# Patient Record
Sex: MALE | Race: WHITE | NOT HISPANIC OR LATINO | URBAN - METROPOLITAN AREA
[De-identification: names, ages, dates, MRNs, and addresses within clinical notes are randomized per-mention and may not be internally consistent; named-entity substitution may affect disease eponyms.]

---

## 2017-01-04 ENCOUNTER — OFFICE VISIT - HEALTHEAST (OUTPATIENT)
Dept: BEHAVIORAL HEALTH | Facility: CLINIC | Age: 37
End: 2017-01-04

## 2017-01-04 DIAGNOSIS — F20.0 PARANOID SCHIZOPHRENIA, CHRONIC CONDITION WITH ACUTE EXACERBATION (H): ICD-10-CM

## 2017-01-10 ENCOUNTER — COMMUNICATION - HEALTHEAST (OUTPATIENT)
Dept: BEHAVIORAL HEALTH | Facility: CLINIC | Age: 37
End: 2017-01-10

## 2017-01-10 DIAGNOSIS — F20.0 PARANOID SCHIZOPHRENIA, CHRONIC CONDITION (H): ICD-10-CM

## 2017-01-11 ENCOUNTER — OFFICE VISIT - HEALTHEAST (OUTPATIENT)
Dept: BEHAVIORAL HEALTH | Facility: CLINIC | Age: 37
End: 2017-01-11

## 2017-01-11 DIAGNOSIS — F20.0 PARANOID SCHIZOPHRENIA, CHRONIC CONDITION WITH ACUTE EXACERBATION (H): ICD-10-CM

## 2017-01-18 ENCOUNTER — OFFICE VISIT - HEALTHEAST (OUTPATIENT)
Dept: BEHAVIORAL HEALTH | Facility: CLINIC | Age: 37
End: 2017-01-18

## 2017-01-18 DIAGNOSIS — F20.0 PARANOID SCHIZOPHRENIA, CHRONIC CONDITION WITH ACUTE EXACERBATION (H): ICD-10-CM

## 2017-02-01 ENCOUNTER — OFFICE VISIT - HEALTHEAST (OUTPATIENT)
Dept: BEHAVIORAL HEALTH | Facility: CLINIC | Age: 37
End: 2017-02-01

## 2017-02-01 DIAGNOSIS — F20.0 PARANOID SCHIZOPHRENIA, CHRONIC CONDITION WITH ACUTE EXACERBATION (H): ICD-10-CM

## 2017-02-03 ENCOUNTER — AMBULATORY - HEALTHEAST (OUTPATIENT)
Dept: HEALTH INFORMATION MANAGEMENT | Facility: CLINIC | Age: 37
End: 2017-02-03

## 2017-02-08 ENCOUNTER — OFFICE VISIT - HEALTHEAST (OUTPATIENT)
Dept: BEHAVIORAL HEALTH | Facility: CLINIC | Age: 37
End: 2017-02-08

## 2017-02-08 ENCOUNTER — COMMUNICATION - HEALTHEAST (OUTPATIENT)
Dept: BEHAVIORAL HEALTH | Facility: CLINIC | Age: 37
End: 2017-02-08

## 2017-02-08 DIAGNOSIS — F20.0 PARANOID SCHIZOPHRENIA, CHRONIC CONDITION WITH ACUTE EXACERBATION (H): ICD-10-CM

## 2017-02-08 DIAGNOSIS — F20.0 PARANOID SCHIZOPHRENIA, CHRONIC CONDITION (H): ICD-10-CM

## 2017-02-15 ENCOUNTER — OFFICE VISIT - HEALTHEAST (OUTPATIENT)
Dept: BEHAVIORAL HEALTH | Facility: CLINIC | Age: 37
End: 2017-02-15

## 2017-02-15 DIAGNOSIS — F20.0 PARANOID SCHIZOPHRENIA, CHRONIC CONDITION WITH ACUTE EXACERBATION (H): ICD-10-CM

## 2017-02-22 ENCOUNTER — OFFICE VISIT - HEALTHEAST (OUTPATIENT)
Dept: BEHAVIORAL HEALTH | Facility: CLINIC | Age: 37
End: 2017-02-22

## 2017-02-22 DIAGNOSIS — F20.0 PARANOID SCHIZOPHRENIA, CHRONIC CONDITION WITH ACUTE EXACERBATION (H): ICD-10-CM

## 2017-03-01 ENCOUNTER — OFFICE VISIT - HEALTHEAST (OUTPATIENT)
Dept: BEHAVIORAL HEALTH | Facility: CLINIC | Age: 37
End: 2017-03-01

## 2017-03-01 ENCOUNTER — COMMUNICATION - HEALTHEAST (OUTPATIENT)
Dept: BEHAVIORAL HEALTH | Facility: CLINIC | Age: 37
End: 2017-03-01

## 2017-03-01 DIAGNOSIS — F20.0 PARANOID SCHIZOPHRENIA, CHRONIC CONDITION WITH ACUTE EXACERBATION (H): ICD-10-CM

## 2017-03-08 ENCOUNTER — OFFICE VISIT - HEALTHEAST (OUTPATIENT)
Dept: BEHAVIORAL HEALTH | Facility: CLINIC | Age: 37
End: 2017-03-08

## 2017-03-08 DIAGNOSIS — F20.0 PARANOID SCHIZOPHRENIA, CHRONIC CONDITION WITH ACUTE EXACERBATION (H): ICD-10-CM

## 2017-03-09 ENCOUNTER — COMMUNICATION - HEALTHEAST (OUTPATIENT)
Dept: BEHAVIORAL HEALTH | Facility: CLINIC | Age: 37
End: 2017-03-09

## 2017-03-09 DIAGNOSIS — F20.0 PARANOID SCHIZOPHRENIA, CHRONIC CONDITION (H): ICD-10-CM

## 2017-03-15 ENCOUNTER — OFFICE VISIT - HEALTHEAST (OUTPATIENT)
Dept: BEHAVIORAL HEALTH | Facility: CLINIC | Age: 37
End: 2017-03-15

## 2017-03-15 DIAGNOSIS — F20.0 PARANOID SCHIZOPHRENIA, CHRONIC CONDITION WITH ACUTE EXACERBATION (H): ICD-10-CM

## 2017-03-22 ENCOUNTER — OFFICE VISIT - HEALTHEAST (OUTPATIENT)
Dept: BEHAVIORAL HEALTH | Facility: CLINIC | Age: 37
End: 2017-03-22

## 2017-03-22 DIAGNOSIS — F20.0 PARANOID SCHIZOPHRENIA, CHRONIC CONDITION WITH ACUTE EXACERBATION (H): ICD-10-CM

## 2017-03-29 ENCOUNTER — OFFICE VISIT - HEALTHEAST (OUTPATIENT)
Dept: BEHAVIORAL HEALTH | Facility: CLINIC | Age: 37
End: 2017-03-29

## 2017-03-29 DIAGNOSIS — F20.0 PARANOID SCHIZOPHRENIA, CHRONIC CONDITION WITH ACUTE EXACERBATION (H): ICD-10-CM

## 2017-04-05 ENCOUNTER — COMMUNICATION - HEALTHEAST (OUTPATIENT)
Dept: BEHAVIORAL HEALTH | Facility: CLINIC | Age: 37
End: 2017-04-05

## 2017-04-05 ENCOUNTER — OFFICE VISIT - HEALTHEAST (OUTPATIENT)
Dept: BEHAVIORAL HEALTH | Facility: CLINIC | Age: 37
End: 2017-04-05

## 2017-04-05 DIAGNOSIS — F20.0 PARANOID SCHIZOPHRENIA, CHRONIC CONDITION WITH ACUTE EXACERBATION (H): ICD-10-CM

## 2017-04-05 DIAGNOSIS — F20.0 PARANOID SCHIZOPHRENIA, CHRONIC CONDITION (H): ICD-10-CM

## 2017-04-12 ENCOUNTER — OFFICE VISIT - HEALTHEAST (OUTPATIENT)
Dept: BEHAVIORAL HEALTH | Facility: CLINIC | Age: 37
End: 2017-04-12

## 2017-04-12 DIAGNOSIS — F20.0 PARANOID SCHIZOPHRENIA, CHRONIC CONDITION WITH ACUTE EXACERBATION (H): ICD-10-CM

## 2017-04-19 ENCOUNTER — OFFICE VISIT - HEALTHEAST (OUTPATIENT)
Dept: BEHAVIORAL HEALTH | Facility: CLINIC | Age: 37
End: 2017-04-19

## 2017-04-19 DIAGNOSIS — F20.0 PARANOID SCHIZOPHRENIA, CHRONIC CONDITION WITH ACUTE EXACERBATION (H): ICD-10-CM

## 2017-04-26 ENCOUNTER — OFFICE VISIT - HEALTHEAST (OUTPATIENT)
Dept: BEHAVIORAL HEALTH | Facility: CLINIC | Age: 37
End: 2017-04-26

## 2017-04-26 DIAGNOSIS — F20.0 PARANOID SCHIZOPHRENIA, CHRONIC CONDITION WITH ACUTE EXACERBATION (H): ICD-10-CM

## 2017-05-02 ENCOUNTER — COMMUNICATION - HEALTHEAST (OUTPATIENT)
Dept: BEHAVIORAL HEALTH | Facility: CLINIC | Age: 37
End: 2017-05-02

## 2017-05-02 DIAGNOSIS — F20.0 PARANOID SCHIZOPHRENIA, CHRONIC CONDITION (H): ICD-10-CM

## 2017-05-10 ENCOUNTER — OFFICE VISIT - HEALTHEAST (OUTPATIENT)
Dept: BEHAVIORAL HEALTH | Facility: CLINIC | Age: 37
End: 2017-05-10

## 2017-05-10 DIAGNOSIS — F20.0 PARANOID SCHIZOPHRENIA, CHRONIC CONDITION WITH ACUTE EXACERBATION (H): ICD-10-CM

## 2017-05-17 ENCOUNTER — OFFICE VISIT - HEALTHEAST (OUTPATIENT)
Dept: BEHAVIORAL HEALTH | Facility: CLINIC | Age: 37
End: 2017-05-17

## 2017-05-17 DIAGNOSIS — F20.0 PARANOID SCHIZOPHRENIA, CHRONIC CONDITION WITH ACUTE EXACERBATION (H): ICD-10-CM

## 2017-05-24 ENCOUNTER — OFFICE VISIT - HEALTHEAST (OUTPATIENT)
Dept: BEHAVIORAL HEALTH | Facility: CLINIC | Age: 37
End: 2017-05-24

## 2017-05-24 DIAGNOSIS — F20.0 PARANOID SCHIZOPHRENIA, CHRONIC CONDITION WITH ACUTE EXACERBATION (H): ICD-10-CM

## 2017-05-30 ENCOUNTER — OFFICE VISIT - HEALTHEAST (OUTPATIENT)
Dept: BEHAVIORAL HEALTH | Facility: CLINIC | Age: 37
End: 2017-05-30

## 2017-05-30 DIAGNOSIS — G25.9 MOVEMENT DISORDER: ICD-10-CM

## 2017-05-30 DIAGNOSIS — T43.3X5A: ICD-10-CM

## 2017-05-30 DIAGNOSIS — Z79.899 HIGH RISK MEDICATION USE: ICD-10-CM

## 2017-05-30 DIAGNOSIS — F20.0 CHRONIC PARANOID SCHIZOPHRENIA (H): ICD-10-CM

## 2017-05-30 ASSESSMENT — MIFFLIN-ST. JEOR: SCORE: 1969.06

## 2017-05-31 ENCOUNTER — OFFICE VISIT - HEALTHEAST (OUTPATIENT)
Dept: BEHAVIORAL HEALTH | Facility: CLINIC | Age: 37
End: 2017-05-31

## 2017-05-31 DIAGNOSIS — F20.0 PARANOID SCHIZOPHRENIA, CHRONIC CONDITION WITH ACUTE EXACERBATION (H): ICD-10-CM

## 2017-06-12 ENCOUNTER — COMMUNICATION - HEALTHEAST (OUTPATIENT)
Dept: BEHAVIORAL HEALTH | Facility: CLINIC | Age: 37
End: 2017-06-12

## 2017-06-12 DIAGNOSIS — Z79.899 HIGH RISK MEDICATION USE: ICD-10-CM

## 2017-06-14 ENCOUNTER — OFFICE VISIT - HEALTHEAST (OUTPATIENT)
Dept: BEHAVIORAL HEALTH | Facility: CLINIC | Age: 37
End: 2017-06-14

## 2017-06-14 DIAGNOSIS — F20.0 PARANOID SCHIZOPHRENIA, CHRONIC CONDITION WITH ACUTE EXACERBATION (H): ICD-10-CM

## 2017-07-05 ENCOUNTER — OFFICE VISIT - HEALTHEAST (OUTPATIENT)
Dept: BEHAVIORAL HEALTH | Facility: CLINIC | Age: 37
End: 2017-07-05

## 2017-07-05 DIAGNOSIS — F20.0 PARANOID SCHIZOPHRENIA, CHRONIC CONDITION WITH ACUTE EXACERBATION (H): ICD-10-CM

## 2017-07-12 ENCOUNTER — OFFICE VISIT - HEALTHEAST (OUTPATIENT)
Dept: BEHAVIORAL HEALTH | Facility: CLINIC | Age: 37
End: 2017-07-12

## 2017-07-12 DIAGNOSIS — F20.0 PARANOID SCHIZOPHRENIA, CHRONIC CONDITION WITH ACUTE EXACERBATION (H): ICD-10-CM

## 2017-07-19 ENCOUNTER — OFFICE VISIT - HEALTHEAST (OUTPATIENT)
Dept: BEHAVIORAL HEALTH | Facility: CLINIC | Age: 37
End: 2017-07-19

## 2017-07-19 DIAGNOSIS — F20.0 PARANOID SCHIZOPHRENIA, CHRONIC CONDITION WITH ACUTE EXACERBATION (H): ICD-10-CM

## 2017-07-26 ENCOUNTER — OFFICE VISIT - HEALTHEAST (OUTPATIENT)
Dept: BEHAVIORAL HEALTH | Facility: CLINIC | Age: 37
End: 2017-07-26

## 2017-07-26 DIAGNOSIS — F20.0 PARANOID SCHIZOPHRENIA, CHRONIC CONDITION WITH ACUTE EXACERBATION (H): ICD-10-CM

## 2017-08-16 ENCOUNTER — OFFICE VISIT - HEALTHEAST (OUTPATIENT)
Dept: BEHAVIORAL HEALTH | Facility: CLINIC | Age: 37
End: 2017-08-16

## 2017-08-16 DIAGNOSIS — F20.0 PARANOID SCHIZOPHRENIA, CHRONIC CONDITION WITH ACUTE EXACERBATION (H): ICD-10-CM

## 2017-08-23 ENCOUNTER — OFFICE VISIT - HEALTHEAST (OUTPATIENT)
Dept: BEHAVIORAL HEALTH | Facility: CLINIC | Age: 37
End: 2017-08-23

## 2017-08-23 DIAGNOSIS — F20.0 PARANOID SCHIZOPHRENIA, CHRONIC CONDITION WITH ACUTE EXACERBATION (H): ICD-10-CM

## 2017-08-25 ENCOUNTER — PRE VISIT (OUTPATIENT)
Dept: NEUROLOGY | Facility: CLINIC | Age: 37
End: 2017-08-25

## 2017-08-25 NOTE — TELEPHONE ENCOUNTER
1.  Date/reason for appt:  9/05/17   Tardive Dyskinesia    2.  Referring provider:  Dr Johnson, [facility]    3.  Call to patient (Yes / No - short description):  No referred    Received records from [facility], forwarded to clinic.    Records reviewed.  All records received.

## 2017-08-30 ENCOUNTER — OFFICE VISIT - HEALTHEAST (OUTPATIENT)
Dept: BEHAVIORAL HEALTH | Facility: CLINIC | Age: 37
End: 2017-08-30

## 2017-08-30 ENCOUNTER — COMMUNICATION - HEALTHEAST (OUTPATIENT)
Dept: BEHAVIORAL HEALTH | Facility: CLINIC | Age: 37
End: 2017-08-30

## 2017-08-30 ENCOUNTER — AMBULATORY - HEALTHEAST (OUTPATIENT)
Dept: BEHAVIORAL HEALTH | Facility: CLINIC | Age: 37
End: 2017-08-30

## 2017-08-30 DIAGNOSIS — F20.0 PARANOID SCHIZOPHRENIA, CHRONIC CONDITION WITH ACUTE EXACERBATION (H): ICD-10-CM

## 2017-09-05 ENCOUNTER — OFFICE VISIT (OUTPATIENT)
Dept: NEUROLOGY | Facility: CLINIC | Age: 37
End: 2017-09-05

## 2017-09-05 ENCOUNTER — RECORDS - HEALTHEAST (OUTPATIENT)
Dept: ADMINISTRATIVE | Facility: OTHER | Age: 37
End: 2017-09-05

## 2017-09-05 VITALS — HEIGHT: 66 IN | SYSTOLIC BLOOD PRESSURE: 128 MMHG | HEART RATE: 89 BPM | DIASTOLIC BLOOD PRESSURE: 84 MMHG

## 2017-09-05 DIAGNOSIS — R25.9 ABNORMAL INVOLUNTARY MOVEMENT: Primary | ICD-10-CM

## 2017-09-05 DIAGNOSIS — Z92.89 H/O CT SCAN OF HEAD: ICD-10-CM

## 2017-09-05 PROBLEM — R63.5 WEIGHT GAIN: Status: ACTIVE | Noted: 2017-09-05

## 2017-09-05 PROBLEM — E66.9 OBESITY: Status: ACTIVE | Noted: 2017-09-05

## 2017-09-05 PROBLEM — F25.9 SCHIZOAFFECTIVE DISORDER (H): Status: ACTIVE | Noted: 2017-09-05

## 2017-09-05 PROBLEM — J98.4 CALCIFIED GRANULOMA OF LUNG: Status: ACTIVE | Noted: 2017-09-05

## 2017-09-05 PROBLEM — F31.9 BIPOLAR 1 DISORDER (H): Status: ACTIVE | Noted: 2017-09-05

## 2017-09-05 PROBLEM — E53.8 VITAMIN B12 DEFICIENCY (NON ANEMIC): Status: ACTIVE | Noted: 2017-09-05

## 2017-09-05 PROBLEM — L21.0 DANDRUFF: Status: ACTIVE | Noted: 2017-09-05

## 2017-09-05 PROBLEM — F30.9 MANIA (H): Status: ACTIVE | Noted: 2017-09-05

## 2017-09-05 PROBLEM — M79.602 LEFT ARM PAIN: Status: ACTIVE | Noted: 2017-09-05

## 2017-09-05 PROBLEM — M54.9 BACK ACHE: Status: ACTIVE | Noted: 2017-09-05

## 2017-09-05 PROBLEM — G47.00 INSOMNIA: Status: ACTIVE | Noted: 2017-09-05

## 2017-09-05 PROBLEM — F32.A DEPRESSION: Status: ACTIVE | Noted: 2017-09-05

## 2017-09-05 RX ORDER — FLUPHENAZINE HYDROCHLORIDE 5 MG/1
5 TABLET ORAL
COMMUNITY

## 2017-09-05 RX ORDER — CHOLECALCIFEROL (VITAMIN D3) 25 MCG
TABLET,CHEWABLE ORAL
COMMUNITY

## 2017-09-05 ASSESSMENT — PAIN SCALES - GENERAL: PAINLEVEL: NO PAIN (0)

## 2017-09-05 NOTE — NURSING NOTE
Chief Complaint   Patient presents with     Consult     Tardive dyskinesia   Radha Odell, ASHLEY

## 2017-09-05 NOTE — PATIENT INSTRUCTIONS
N- Acetyl Cysteine (NAC) 600 mg daily can be taken to help with restlessness (Akathisia). Increase to twice daily in two weeks. This is a supplement which can be found at the pharmacy. Keep taking fluphenazine and discuss further dose changes with your psychiatrist. Other medications which could be considered include propranolol, benzodiazepines, or an anticholinergic.

## 2017-09-05 NOTE — PROGRESS NOTES
Summary and Recommendations:     IMPRESSION:  Tardive akathisia associated with neuroleptic use. We reviewed and discussed various treatment options, including N-acetylcysteine (NAC), benzodiazepines, and anticholinergics. He did not tolerate propranolol. Patient would likely not tolerate tetrabenazine due to risk of depression. Benzodiazepines would contribute to sedation. After discussing all the options, he preferred to try a supplement and we recommended trial of NAC. He will start at 600 mg daily and increase to 600 mg bid in two weeks.     RECOMMENDATIONS:  N-Acetylcysteine (NAC), titrate as discussed. If ineffective, could consider other options, as outlined above (benzodiazepines, anticholinergics).     Alexus Germain MD  Movement Disorders Fellow    Patient seen and discussed with Dr. Crow. Assessment and plan developed with Dr. Crow.     PATIENT SEEN AND EXAMINED BY ME on September 5, 2017 I AGREE WITH THE FINDINGS DETAILED BY THE NEUROLOGY Fellow and documented in their note on September 5, 2017   PLEASE REFER TO THEIR NOTE FOR ADDITIONAL DETAILS.     DIAGNOSIS  Akathisia. There may be some subtle td features but not seen or described by patient or mother   MANAGEMENT  As above.     LISE Crow MD  _____________________________________________________________________  Cc: 36 year old male   Consult requested by psychiatry, Dr. Johnson.     Outside records reviewed and revealed  - inserted.       History obtained from patient and his mother with assistance of Congolese interpretor.        History of Present Illness:  37 yo man with schizoaffective disorder here for an evaluation of involuntary movements. He and his mother described swaying movements of the trunk and restlessness. The patient feels this is related to his antipsychotic medication. He has a hard time staying still. If standing, he must be walking. If sitting, his leg is bobbing and he sways with his trunk. He is only still when  he is concentrating. There is no tremor. His psychiatrist decreased the fluphenazine from 7 mg to 5 mg bid and the movements may have decreased. The patient does not like taking medications.      He previously had been on a variety of medications:   zyprexa  abilify  risperdal  Haloperidol    Most currently he is taking:  Fluphenazine 5 mg bid  Vitamin B12  Propanolol 10 mg bid - tried for rocking behavior - took once and no longer taking    2016 July  CT Head Without Contrast7/15/2016  [facility]  Result Impression   CONCLUSION:  1.  No acute hemorrhage, mass or CT evidence of acute infarct.   Result Narrative   [facility]  CT HEAD WO CONTRAST  7/15/2016 3:36 PM     INDICATION: Altered mental status.  TECHNIQUE: Head CT without IV contrast. Dose reduction techniques were used.  COMPARISON: None.    FINDINGS: No acute intracranial hemorrhage. No mass effect. Normal ventricles. The gray-white matter differentiation is maintained. The calvarium and skull base are unremarkable.  Visualized portions of the orbits are unremarkable.  Visualized portions   of the paranasal sinuses and mastoid air cells are clear.       Status         Allergies    No Known Allergies  Current Medications  Reconcile with Patient's Chart    Prescription Sig. Disp. Refills Start Date End Date Status   ARIPiprazole (AKA ABILIFY) 15 MG tablet   Take 15 mg by mouth daily.         Active   vitamin b complex-c (AKA SURBEX-T) capsule   Take 1 Cap by mouth daily.         Active   Active Problems    Problem Noted Date   Obesity (HRC) 07/12/2016   Paranoid schizophrenia (HR) 06/17/2016   Hyperlipidemia (HR) 06/17/2016           14 Review of systems  are negative except for   Patient Active Problem List   Diagnosis     Insomnia     Abnormal involuntary movement     Silvana (H)     Schizoaffective disorder (H)     Calcified granuloma of lung (H)     Obesity     Vitamin B12 deficiency (non anemic)     Dandruff     Weight gain      Left arm pain     Bipolar 1 disorder (H)     Depression     Back ache     Backache     Depressive disorder     Hyperlipidemia     Paranoid schizophrenia (H)     H/O CT scan of head     Chronic paranoid schizophrenia (H)     Clinical depression     Mood disorder in conditions classified elsewhere     Encounters for administrative purposes     Other dysfunctions of sleep stages or arousal from sleep      No Known Allergies  History reviewed. No pertinent surgical history.  Past Medical History:   Diagnosis Date     Abnormal involuntary movement 9/5/2017     Back ache 9/5/2017     Bipolar 1 disorder (H) 9/5/2017     Calcified granuloma of lung (H) 9/5/2017     Dandruff 9/5/2017     Depression 9/5/2017     H/O CT scan of head 9/5/2017 2016 July CT Head Without Contrast7/15/2016 [facility] Result Impression CONCLUSION: 1.  No acute hemorrhage, mass or CT evidence of acute infarct. Result Narrative [facility] CT HEAD WO CONTRAST 7/15/2016 3:36 PM   INDICATION: Altered mental status. TECHNIQUE: Head CT without IV contrast. Dose reduction techniques were used. COMPARISON: None.  FINDINGS: No acute intracrania     Insomnia 9/5/2017     Left arm pain 9/5/2017     Silvana (H) 9/5/2017     Obesity 9/5/2017     Schizoaffective disorder (H) 9/5/2017     Vitamin B12 deficiency (non anemic) 9/5/2017     Weight gain 9/5/2017     Social History     Social History     Marital status: Single     Spouse name: N/A     Number of children: N/A     Years of education: N/A     Occupational History     Not on file.     Social History Main Topics     Smoking status: Never Smoker     Smokeless tobacco: Never Used     Alcohol use Not on file     Drug use: Not on file     Sexual activity: Not on file     Other Topics Concern     Not on file     Social History Narrative    single. lives in [place]. Ilene     History reviewed. No pertinent family history.  Current Outpatient Prescriptions   Medication Sig Dispense  "Refill     fluPHENAZine (PROLIXIN) 5 MG tablet Take 5 mg by mouth       Cyanocobalamin (B-12) 2500 MCG TABS        No family history of schizophrenia, abnormal movements or tremor.     Examination  B/P: Data Unavailable, T: Data Unavailable, P: Data Unavailable, R: Data Unavailable 0 lbs 0 oz  Blood pressure 128/84, pulse 89, height 1.676 m (5' 6\")., There is no height or weight on file to calculate BMI.    General examination: well developed, nourished and normal affect  Carotid: No bruits. Chest CTA, Heart regular without gallops or murmurs. Abdomen soft nontender, no masses, bowel sounds intact. Periphery: normal pulses without edema. No skin lesions. MENTAL STATUS:  Alert, oriented x3.  Speech fluent with normal naming, repetition, comprehension.  Good right-left orientation   CRANIAL NERVES:  Pupils are equal, round, reactive to light.  Normal fields. Extraocular movements full.  Facial sensation and movement normal.  Hearing intact. Palate moves symmetrically.  Tongue midline.  Sternocleidomastoid and trapezius strength intact.  Neck strength was normal.  NEUROLOGIC:  Tone: is normal. Motor in upper and lower extremities. 5/5.  Reflexes 3 bilateral biceps, brachioradialis, triceps, patellae but symmetric with no spread. No clonus and no pathologic reflexes (neg Dane or Babinksi). Good finger-nose-finger, fine finger movement, heel-shin maneuver, sensation to light touch, position sense and vibration and temperature was normal. Gait normal. Tandem is steady. Romberg and postural stability normal. Tremor absent.           "

## 2017-09-05 NOTE — LETTER
9/5/2017     RE: Waylon Townsend  [facility] [place] [id number]     Dear Colleague,    Thank you for referring your patient, Waylon Townsend, to the [facility] NEUROLOGY at [facility]. Please see a copy of my visit note below.    Summary and Recommendations:     IMPRESSION:  Tardive akathisia associated with neuroleptic use. We reviewed and discussed various treatment options, including N-acetylcysteine (NAC), benzodiazepines, and anticholinergics. He did not tolerate propranolol. Patient would likely not tolerate tetrabenazine due to risk of depression. Benzodiazepines would contribute to sedation. After discussing all the options, he preferred to try a supplement and we recommended trial of NAC. He will start at 600 mg daily and increase to 600 mg bid in two weeks.     RECOMMENDATIONS:  N-Acetylcysteine (NAC), titrate as discussed. If ineffective, could consider other options, as outlined above (benzodiazepines, anticholinergics).     Alexus Germain MD  Movement Disorders Fellow    Patient seen and discussed with Dr. Crow. Assessment and plan developed with Dr. Crow.     PATIENT SEEN AND EXAMINED BY ME on September 5, 2017 I AGREE WITH THE FINDINGS DETAILED BY THE NEUROLOGY Fellow and documented in their note on September 5, 2017   PLEASE REFER TO THEIR NOTE FOR ADDITIONAL DETAILS.     DIAGNOSIS  Akathisia. There may be some subtle td features but not seen or described by patient or mother   MANAGEMENT  As above.     LISE Crow MD  _____________________________________________________________________  Cc: 36 year old male   Consult requested by psychiatry, Dr. Johnson.     Outside records reviewed and revealed  - inserted.       History obtained from patient and his mother with assistance of British interpretor.        History of Present Illness:  35 yo man with schizoaffective disorder here for an evaluation of involuntary movements. He and his  mother described swaying movements of the trunk and restlessness. The patient feels this is related to his antipsychotic medication. He has a hard time staying still. If standing, he must be walking. If sitting, his leg is bobbing and he sways with his trunk. He is only still when he is concentrating. There is no tremor. His psychiatrist decreased the fluphenazine from 7 mg to 5 mg bid and the movements may have decreased. The patient does not like taking medications.      He previously had been on a variety of medications:   zyprexa  abilify  risperdal  Haloperidol    Most currently he is taking:  Fluphenazine 5 mg bid  Vitamin B12  Propanolol 10 mg bid - tried for rocking behavior - took once and no longer taking    2016 July  CT Head Without Contrast7/15/2016  [facility]  Result Impression   CONCLUSION:  1.  No acute hemorrhage, mass or CT evidence of acute infarct.   Result Narrative   [facility]  CT HEAD WO CONTRAST  7/15/2016 3:36 PM     INDICATION: Altered mental status.  TECHNIQUE: Head CT without IV contrast. Dose reduction techniques were used.  COMPARISON: None.    FINDINGS: No acute intracranial hemorrhage. No mass effect. Normal ventricles. The gray-white matter differentiation is maintained. The calvarium and skull base are unremarkable.  Visualized portions of the orbits are unremarkable.  Visualized portions   of the paranasal sinuses and mastoid air cells are clear.       Status         Allergies    No Known Allergies  Current Medications  Reconcile with Patient's Chart    Prescription Sig. Disp. Refills Start Date End Date Status   ARIPiprazole (AKA ABILIFY) 15 MG tablet   Take 15 mg by mouth daily.         Active   vitamin b complex-c (AKA SURBEX-T) capsule   Take 1 Cap by mouth daily.         Active   Active Problems    Problem Noted Date   Obesity (HRC) 07/12/2016   Paranoid schizophrenia (HR) 06/17/2016   Hyperlipidemia (HR) 06/17/2016           14 Review of systems   are negative except for   Patient Active Problem List   Diagnosis     Insomnia     Abnormal involuntary movement     Silvana (H)     Schizoaffective disorder (H)     Calcified granuloma of lung (H)     Obesity     Vitamin B12 deficiency (non anemic)     Dandruff     Weight gain     Left arm pain     Bipolar 1 disorder (H)     Depression     Back ache     Backache     Depressive disorder     Hyperlipidemia     Paranoid schizophrenia (H)     H/O CT scan of head     Chronic paranoid schizophrenia (H)     Clinical depression     Mood disorder in conditions classified elsewhere     Encounters for administrative purposes     Other dysfunctions of sleep stages or arousal from sleep      No Known Allergies  History reviewed. No pertinent surgical history.  Past Medical History:   Diagnosis Date     Abnormal involuntary movement 9/5/2017     Back ache 9/5/2017     Bipolar 1 disorder (H) 9/5/2017     Calcified granuloma of lung (H) 9/5/2017     Dandruff 9/5/2017     Depression 9/5/2017     H/O CT scan of head 9/5/2017 2016 July CT Head Without Contrast7/15/2016 [facility] Result Impression CONCLUSION: 1.  No acute hemorrhage, mass or CT evidence of acute infarct. Result Narrative [facility] CT HEAD WO CONTRAST 7/15/2016 3:36 PM   INDICATION: Altered mental status. TECHNIQUE: Head CT without IV contrast. Dose reduction techniques were used. COMPARISON: None.  FINDINGS: No acute intracrania     Insomnia 9/5/2017     Left arm pain 9/5/2017     Silvana (H) 9/5/2017     Obesity 9/5/2017     Schizoaffective disorder (H) 9/5/2017     Vitamin B12 deficiency (non anemic) 9/5/2017     Weight gain 9/5/2017     Social History     Social History     Marital status: Single     Spouse name: N/A     Number of children: N/A     Years of education: N/A     Occupational History     Not on file.     Social History Main Topics     Smoking status: Never Smoker     Smokeless tobacco: Never Used     Alcohol use Not on file      "Drug use: Not on file     Sexual activity: Not on file     Other Topics Concern     Not on file     Social History Narrative    single. lives in [place]. Ilene     History reviewed. No pertinent family history.  Current Outpatient Prescriptions   Medication Sig Dispense Refill     fluPHENAZine (PROLIXIN) 5 MG tablet Take 5 mg by mouth       Cyanocobalamin (B-12) 2500 MCG TABS        No family history of schizophrenia, abnormal movements or tremor.     Examination  B/P: Data Unavailable, T: Data Unavailable, P: Data Unavailable, R: Data Unavailable 0 lbs 0 oz  Blood pressure 128/84, pulse 89, height 1.676 m (5' 6\")., There is no height or weight on file to calculate BMI.    General examination: well developed, nourished and normal affect  Carotid: No bruits. Chest CTA, Heart regular without gallops or murmurs. Abdomen soft nontender, no masses, bowel sounds intact. Periphery: normal pulses without edema. No skin lesions. MENTAL STATUS:  Alert, oriented x3.  Speech fluent with normal naming, repetition, comprehension.  Good right-left orientation   CRANIAL NERVES:  Pupils are equal, round, reactive to light.  Normal fields. Extraocular movements full.  Facial sensation and movement normal.  Hearing intact. Palate moves symmetrically.  Tongue midline.  Sternocleidomastoid and trapezius strength intact.  Neck strength was normal.  NEUROLOGIC:  Tone: is normal. Motor in upper and lower extremities. 5/5.  Reflexes 3 bilateral biceps, brachioradialis, triceps, patellae but symmetric with no spread. No clonus and no pathologic reflexes (neg Dane or Babinksi). Good finger-nose-finger, fine finger movement, heel-shin maneuver, sensation to light touch, position sense and vibration and temperature was normal. Gait normal. Tandem is steady. Romberg and postural stability normal. Tremor absent.     Again, thank you for allowing me to participate in the care of your patient.      Sincerely,    Derian Crow MD      "

## 2017-09-05 NOTE — MR AVS SNAPSHOT
After Visit Summary   2017    Waylon Townsend    MRN: [id number]           Patient Information     Date Of Birth          1980        Visit Information        Provider Department      2017 2:55 PM Marty Vasquez; Derian Crow MD [facility] Neurology        Today's Diagnoses     Abnormal involuntary movement    -  1    H/O CT scan of head          Care Instructions    N- Acetyl Cysteine (NAC) 600 mg daily can be taken to help with restlessness (Akathisia). Increase to twice daily in two weeks. This is a supplement which can be found at the pharmacy. Keep taking fluphenazine and discuss further dose changes with your psychiatrist. Other medications which could be considered include propranolol, benzodiazepines, or an anticholinergic.           Follow-ups after your visit        Who to contact     Please call your clinic at [phone] to:    Ask questions about your health    Make or cancel appointments    Discuss your medicines    Learn about your test results    Speak to your doctor   If you have compliments or concerns about an experience at your clinic, or if you wish to file a complaint, please contact [facility] Patient Relations at [phone] or email us at [email or web address]         Additional Information About Your Visit        MyChart Information     Zaarlyt is an electronic gateway that provides easy, online access to your medical records. With Digitick, you can request a clinic appointment, read your test results, renew a prescription or communicate with your care team.     To sign up for Zaarlyt visit the website at [email or web address]   You will be asked to enter the access code listed below, as well as some personal information. Please follow the directions to create your username and password.     Your access code is: [email or web address]  Expires: 2017  6:30 AM     Your access code will  in 90 days. If you need  "help or a new code, please contact your [facility] or call [phone] for assistance.        Care EveryWhere ID     This is your Care EveryWhere ID. This could be used by other organizations to access your [facility] medical records  [id number]        Your Vitals Were     Pulse Height                89 1.676 m (5' 6\")           Blood Pressure from Last 3 Encounters:   09/05/17 128/84    Weight from Last 3 Encounters:   No data found for Wt              Today, you had the following     No orders found for display       Primary Care Provider Office Phone # Fax #    Concepción Hutchison [phone]       [facility] [address] [facility] [address]        Equal Access to Services     AR GARCIA : Hadii ami gongorao Somehdi, waaxda luqadaha, qaybta kaalmada adeegyada, waxpamela taylorin pietro chino . So [facility] [phone].    ATENCIÓN: Si habla español, tiene a ziegler disposición servicios gratuitos de asistencia lingüística. [facility] [phone].    We comply with applicable federal civil rights laws and [place] laws. We do not discriminate on the basis of race, color, national origin, age, disability sex, sexual orientation or gender identity.            Thank you!     Thank you for choosing [facility] NEUROLOGY  for your care. Our goal is always to provide you with excellent care. Hearing back from our patients is one way we can continue to improve our services. Please take a few minutes to complete the written survey that you may receive in the mail after your visit with us. Thank you!             Your Updated Medication List - Protect others around you: Learn how to safely use, store and throw away your medicines at www.disposemymeds.org.          This list is accurate as of: 9/5/17  4:07 PM.  Always use your most recent med list.                   Brand Name Dispense Instructions for use Diagnosis    B-12 2500 MCG Tabs           fluPHENAZine 5 " MG tablet    PROLIXIN     Take 5 mg by mouth

## 2017-09-05 NOTE — Clinical Note
9/5/2017       RE: Waylon Townsend  [address] [place] [address]     Dear Colleague,    Thank you for referring your patient, Waylon Townsend, to the [facility] NEUROLOGY at [facility]. Please see a copy of my visit note below.    No notes on file    Again, thank you for allowing me to participate in the care of your patient.      Sincerely,    Derian Crow MD

## 2017-09-06 ENCOUNTER — OFFICE VISIT - HEALTHEAST (OUTPATIENT)
Dept: BEHAVIORAL HEALTH | Facility: CLINIC | Age: 37
End: 2017-09-06

## 2017-09-06 DIAGNOSIS — F20.0 PARANOID SCHIZOPHRENIA, CHRONIC CONDITION WITH ACUTE EXACERBATION (H): ICD-10-CM

## 2017-09-07 ENCOUNTER — OFFICE VISIT - HEALTHEAST (OUTPATIENT)
Dept: BEHAVIORAL HEALTH | Facility: CLINIC | Age: 37
End: 2017-09-07

## 2017-09-07 DIAGNOSIS — F25.0 SCHIZOAFFECTIVE DISORDER, BIPOLAR TYPE (H): ICD-10-CM

## 2017-09-07 DIAGNOSIS — T88.7XXA DRUG SIDE EFFECTS: ICD-10-CM

## 2017-09-07 DIAGNOSIS — G25.9 MOVEMENT DISORDER: ICD-10-CM

## 2017-09-07 DIAGNOSIS — Z79.899 HIGH RISK MEDICATION USE: ICD-10-CM

## 2017-09-07 ASSESSMENT — MIFFLIN-ST. JEOR: SCORE: 1987.21

## 2017-09-13 ENCOUNTER — OFFICE VISIT - HEALTHEAST (OUTPATIENT)
Dept: BEHAVIORAL HEALTH | Facility: CLINIC | Age: 37
End: 2017-09-13

## 2017-09-13 DIAGNOSIS — F20.0 PARANOID SCHIZOPHRENIA, CHRONIC CONDITION WITH ACUTE EXACERBATION (H): ICD-10-CM

## 2017-09-20 ENCOUNTER — OFFICE VISIT - HEALTHEAST (OUTPATIENT)
Dept: BEHAVIORAL HEALTH | Facility: CLINIC | Age: 37
End: 2017-09-20

## 2017-09-20 DIAGNOSIS — F20.0 PARANOID SCHIZOPHRENIA, CHRONIC CONDITION WITH ACUTE EXACERBATION (H): ICD-10-CM

## 2017-09-25 ENCOUNTER — COMMUNICATION - HEALTHEAST (OUTPATIENT)
Dept: BEHAVIORAL HEALTH | Facility: CLINIC | Age: 37
End: 2017-09-25

## 2017-10-04 ENCOUNTER — OFFICE VISIT - HEALTHEAST (OUTPATIENT)
Dept: BEHAVIORAL HEALTH | Facility: CLINIC | Age: 37
End: 2017-10-04

## 2017-10-04 DIAGNOSIS — F20.0 PARANOID SCHIZOPHRENIA, CHRONIC CONDITION WITH ACUTE EXACERBATION (H): ICD-10-CM

## 2017-10-11 ENCOUNTER — OFFICE VISIT - HEALTHEAST (OUTPATIENT)
Dept: BEHAVIORAL HEALTH | Facility: CLINIC | Age: 37
End: 2017-10-11

## 2017-10-11 DIAGNOSIS — F20.0 PARANOID SCHIZOPHRENIA, CHRONIC CONDITION WITH ACUTE EXACERBATION (H): ICD-10-CM

## 2017-10-18 ENCOUNTER — OFFICE VISIT - HEALTHEAST (OUTPATIENT)
Dept: BEHAVIORAL HEALTH | Facility: CLINIC | Age: 37
End: 2017-10-18

## 2017-10-18 DIAGNOSIS — F20.0 PARANOID SCHIZOPHRENIA, CHRONIC CONDITION WITH ACUTE EXACERBATION (H): ICD-10-CM

## 2017-10-25 ENCOUNTER — OFFICE VISIT - HEALTHEAST (OUTPATIENT)
Dept: BEHAVIORAL HEALTH | Facility: CLINIC | Age: 37
End: 2017-10-25

## 2017-10-25 DIAGNOSIS — F20.0 PARANOID SCHIZOPHRENIA, CHRONIC CONDITION WITH ACUTE EXACERBATION (H): ICD-10-CM

## 2017-10-26 ENCOUNTER — COMMUNICATION - HEALTHEAST (OUTPATIENT)
Dept: BEHAVIORAL HEALTH | Facility: CLINIC | Age: 37
End: 2017-10-26

## 2017-10-26 DIAGNOSIS — F25.0 SCHIZOAFFECTIVE DISORDER, BIPOLAR TYPE (H): ICD-10-CM

## 2017-11-01 ENCOUNTER — OFFICE VISIT - HEALTHEAST (OUTPATIENT)
Dept: BEHAVIORAL HEALTH | Facility: CLINIC | Age: 37
End: 2017-11-01

## 2017-11-01 DIAGNOSIS — F20.0 PARANOID SCHIZOPHRENIA, CHRONIC CONDITION WITH ACUTE EXACERBATION (H): ICD-10-CM

## 2017-11-22 ENCOUNTER — OFFICE VISIT - HEALTHEAST (OUTPATIENT)
Dept: BEHAVIORAL HEALTH | Facility: CLINIC | Age: 37
End: 2017-11-22

## 2017-11-22 DIAGNOSIS — F20.0 PARANOID SCHIZOPHRENIA, CHRONIC CONDITION WITH ACUTE EXACERBATION (H): ICD-10-CM

## 2017-11-24 ENCOUNTER — AMBULATORY - HEALTHEAST (OUTPATIENT)
Dept: BEHAVIORAL HEALTH | Facility: CLINIC | Age: 37
End: 2017-11-24

## 2017-12-15 ENCOUNTER — COMMUNICATION - HEALTHEAST (OUTPATIENT)
Dept: BEHAVIORAL HEALTH | Facility: CLINIC | Age: 37
End: 2017-12-15

## 2017-12-20 ENCOUNTER — OFFICE VISIT - HEALTHEAST (OUTPATIENT)
Dept: BEHAVIORAL HEALTH | Facility: CLINIC | Age: 37
End: 2017-12-20

## 2017-12-20 DIAGNOSIS — F20.0 PARANOID SCHIZOPHRENIA, CHRONIC CONDITION WITH ACUTE EXACERBATION (H): ICD-10-CM

## 2017-12-27 ENCOUNTER — OFFICE VISIT - HEALTHEAST (OUTPATIENT)
Dept: BEHAVIORAL HEALTH | Facility: CLINIC | Age: 37
End: 2017-12-27

## 2017-12-27 DIAGNOSIS — F20.0 PARANOID SCHIZOPHRENIA, CHRONIC CONDITION WITH ACUTE EXACERBATION (H): ICD-10-CM

## 2018-01-03 ENCOUNTER — OFFICE VISIT - HEALTHEAST (OUTPATIENT)
Dept: BEHAVIORAL HEALTH | Facility: CLINIC | Age: 38
End: 2018-01-03

## 2018-01-03 DIAGNOSIS — F20.0 PARANOID SCHIZOPHRENIA, CHRONIC CONDITION WITH ACUTE EXACERBATION (H): ICD-10-CM

## 2018-01-09 ENCOUNTER — OFFICE VISIT - HEALTHEAST (OUTPATIENT)
Dept: BEHAVIORAL HEALTH | Facility: CLINIC | Age: 38
End: 2018-01-09

## 2018-01-09 ENCOUNTER — COMMUNICATION - HEALTHEAST (OUTPATIENT)
Dept: BEHAVIORAL HEALTH | Facility: CLINIC | Age: 38
End: 2018-01-09

## 2018-01-09 DIAGNOSIS — Z79.899 HIGH RISK MEDICATION USE: ICD-10-CM

## 2018-01-09 DIAGNOSIS — G25.9 MOVEMENT DISORDER: ICD-10-CM

## 2018-01-09 DIAGNOSIS — T50.905D ADVERSE EFFECT OF DRUG, SUBSEQUENT ENCOUNTER: ICD-10-CM

## 2018-01-09 DIAGNOSIS — T88.7XXA DRUG SIDE EFFECTS: ICD-10-CM

## 2018-01-09 DIAGNOSIS — T43.3X5A: ICD-10-CM

## 2018-01-09 DIAGNOSIS — F25.0 SCHIZOAFFECTIVE DISORDER, BIPOLAR TYPE (H): ICD-10-CM

## 2018-01-09 DIAGNOSIS — F20.5 RESIDUAL SCHIZOPHRENIA (H): ICD-10-CM

## 2018-01-09 DIAGNOSIS — F20.0 PARANOID SCHIZOPHRENIA (H): ICD-10-CM

## 2018-01-09 DIAGNOSIS — F20.0 PARANOID SCHIZOPHRENIA, CHRONIC CONDITION (H): ICD-10-CM

## 2018-01-09 ASSESSMENT — MIFFLIN-ST. JEOR: SCORE: 2005.35

## 2018-01-17 ENCOUNTER — OFFICE VISIT - HEALTHEAST (OUTPATIENT)
Dept: BEHAVIORAL HEALTH | Facility: CLINIC | Age: 38
End: 2018-01-17

## 2018-01-17 DIAGNOSIS — F25.0 SCHIZOAFFECTIVE DISORDER, BIPOLAR TYPE (H): ICD-10-CM

## 2018-01-31 ENCOUNTER — OFFICE VISIT - HEALTHEAST (OUTPATIENT)
Dept: BEHAVIORAL HEALTH | Facility: CLINIC | Age: 38
End: 2018-01-31

## 2018-01-31 DIAGNOSIS — F25.0 SCHIZOAFFECTIVE DISORDER, BIPOLAR TYPE (H): ICD-10-CM

## 2018-02-07 ENCOUNTER — OFFICE VISIT - HEALTHEAST (OUTPATIENT)
Dept: BEHAVIORAL HEALTH | Facility: CLINIC | Age: 38
End: 2018-02-07

## 2018-02-07 DIAGNOSIS — F25.0 SCHIZOAFFECTIVE DISORDER, BIPOLAR TYPE (H): ICD-10-CM

## 2018-02-09 ENCOUNTER — TELEPHONE (OUTPATIENT)
Dept: CARE COORDINATION | Facility: CLINIC | Age: 38
End: 2018-02-09

## 2018-02-09 NOTE — TELEPHONE ENCOUNTER
"I received Authorization for release of Information from [facility] requesting last office visit, current medication list and diagnosis list per request, this information was faxed over at 1pm on 2/9/18 by myself and showed as \"ok\"   "

## 2018-02-14 ENCOUNTER — OFFICE VISIT - HEALTHEAST (OUTPATIENT)
Dept: BEHAVIORAL HEALTH | Facility: CLINIC | Age: 38
End: 2018-02-14

## 2018-02-14 DIAGNOSIS — F25.0 SCHIZOAFFECTIVE DISORDER, BIPOLAR TYPE (H): ICD-10-CM

## 2018-02-19 ENCOUNTER — TELEPHONE (OUTPATIENT)
Dept: NEUROLOGY | Facility: CLINIC | Age: 38
End: 2018-02-19

## 2018-02-23 ENCOUNTER — TELEPHONE (OUTPATIENT)
Dept: NEUROLOGY | Facility: CLINIC | Age: 38
End: 2018-02-23

## 2018-02-23 NOTE — TELEPHONE ENCOUNTER
----- Message from Tosha Matta sent at 2/23/2018  8:59 AM CST -----  Regarding: Pt saw Dr Crow once - day care program needs to know if he prescribed any medications  Contact: [phone]  Pt of John Paul Tse (you can call her Maggie) called from [facility], Ph # [phone], Fax # [phone],    Said she needs a call back regarding Pt medications, Pt was seen once by Dr Crow and they need to know if he prescribed any medications for this Pt or not,    Please let them know either way, Said Pt is on Prolixin and Acetylcysteine. They need to know if those are from Dr Crow or not. And if they are they need clear instructions on dosage etc.    Please return her call,    Thank you,  Tosha  [facility]    Please DO NOT send this message and/or reply back to sender.  Call Center Representatives DO NOT respond to messages.

## 2018-02-28 ENCOUNTER — OFFICE VISIT - HEALTHEAST (OUTPATIENT)
Dept: BEHAVIORAL HEALTH | Facility: CLINIC | Age: 38
End: 2018-02-28

## 2018-02-28 DIAGNOSIS — F25.0 SCHIZOAFFECTIVE DISORDER, BIPOLAR TYPE (H): ICD-10-CM

## 2018-03-07 ENCOUNTER — OFFICE VISIT - HEALTHEAST (OUTPATIENT)
Dept: BEHAVIORAL HEALTH | Facility: CLINIC | Age: 38
End: 2018-03-07

## 2018-03-07 DIAGNOSIS — F25.0 SCHIZOAFFECTIVE DISORDER, BIPOLAR TYPE (H): ICD-10-CM

## 2018-03-14 ENCOUNTER — OFFICE VISIT - HEALTHEAST (OUTPATIENT)
Dept: BEHAVIORAL HEALTH | Facility: CLINIC | Age: 38
End: 2018-03-14

## 2018-03-14 DIAGNOSIS — F25.0 SCHIZOAFFECTIVE DISORDER, BIPOLAR TYPE (H): ICD-10-CM

## 2018-03-21 ENCOUNTER — OFFICE VISIT - HEALTHEAST (OUTPATIENT)
Dept: BEHAVIORAL HEALTH | Facility: CLINIC | Age: 38
End: 2018-03-21

## 2018-03-21 DIAGNOSIS — F25.0 SCHIZOAFFECTIVE DISORDER, BIPOLAR TYPE (H): ICD-10-CM

## 2018-03-28 ENCOUNTER — OFFICE VISIT - HEALTHEAST (OUTPATIENT)
Dept: BEHAVIORAL HEALTH | Facility: CLINIC | Age: 38
End: 2018-03-28

## 2018-03-28 DIAGNOSIS — F25.0 SCHIZOAFFECTIVE DISORDER, BIPOLAR TYPE (H): ICD-10-CM

## 2018-04-04 ENCOUNTER — OFFICE VISIT - HEALTHEAST (OUTPATIENT)
Dept: BEHAVIORAL HEALTH | Facility: CLINIC | Age: 38
End: 2018-04-04

## 2018-04-04 DIAGNOSIS — F25.0 SCHIZOAFFECTIVE DISORDER, BIPOLAR TYPE (H): ICD-10-CM

## 2018-04-10 ENCOUNTER — OFFICE VISIT - HEALTHEAST (OUTPATIENT)
Dept: BEHAVIORAL HEALTH | Facility: CLINIC | Age: 38
End: 2018-04-10

## 2018-04-10 DIAGNOSIS — G25.9 MOVEMENT DISORDER: ICD-10-CM

## 2018-04-10 DIAGNOSIS — F25.0 SCHIZOAFFECTIVE DISORDER, BIPOLAR TYPE (H): ICD-10-CM

## 2018-04-10 DIAGNOSIS — Z79.899 HIGH RISK MEDICATION USE: ICD-10-CM

## 2018-04-10 DIAGNOSIS — Z71.89 ENCOUNTER FOR FAMILY CONFERENCE WITH PATIENT PRESENT: ICD-10-CM

## 2018-04-10 ASSESSMENT — MIFFLIN-ST. JEOR: SCORE: 1996.28

## 2018-04-11 ENCOUNTER — OFFICE VISIT - HEALTHEAST (OUTPATIENT)
Dept: BEHAVIORAL HEALTH | Facility: CLINIC | Age: 38
End: 2018-04-11

## 2018-04-11 DIAGNOSIS — F25.0 SCHIZOAFFECTIVE DISORDER, BIPOLAR TYPE (H): ICD-10-CM

## 2018-04-18 ENCOUNTER — OFFICE VISIT - HEALTHEAST (OUTPATIENT)
Dept: BEHAVIORAL HEALTH | Facility: CLINIC | Age: 38
End: 2018-04-18

## 2018-04-18 DIAGNOSIS — F25.0 SCHIZOAFFECTIVE DISORDER, BIPOLAR TYPE (H): ICD-10-CM

## 2018-04-25 ENCOUNTER — OFFICE VISIT - HEALTHEAST (OUTPATIENT)
Dept: BEHAVIORAL HEALTH | Facility: CLINIC | Age: 38
End: 2018-04-25

## 2018-04-25 DIAGNOSIS — F25.0 SCHIZOAFFECTIVE DISORDER, BIPOLAR TYPE (H): ICD-10-CM

## 2018-05-02 ENCOUNTER — OFFICE VISIT - HEALTHEAST (OUTPATIENT)
Dept: BEHAVIORAL HEALTH | Facility: CLINIC | Age: 38
End: 2018-05-02

## 2018-05-02 DIAGNOSIS — F25.0 SCHIZOAFFECTIVE DISORDER, BIPOLAR TYPE (H): ICD-10-CM

## 2018-05-09 ENCOUNTER — OFFICE VISIT - HEALTHEAST (OUTPATIENT)
Dept: BEHAVIORAL HEALTH | Facility: CLINIC | Age: 38
End: 2018-05-09

## 2018-05-09 DIAGNOSIS — F25.0 SCHIZOAFFECTIVE DISORDER, BIPOLAR TYPE (H): ICD-10-CM

## 2018-05-16 ENCOUNTER — OFFICE VISIT - HEALTHEAST (OUTPATIENT)
Dept: BEHAVIORAL HEALTH | Facility: CLINIC | Age: 38
End: 2018-05-16

## 2018-05-16 DIAGNOSIS — F25.0 SCHIZOAFFECTIVE DISORDER, BIPOLAR TYPE (H): ICD-10-CM

## 2018-05-23 ENCOUNTER — OFFICE VISIT - HEALTHEAST (OUTPATIENT)
Dept: BEHAVIORAL HEALTH | Facility: CLINIC | Age: 38
End: 2018-05-23

## 2018-05-23 DIAGNOSIS — F25.0 SCHIZOAFFECTIVE DISORDER, BIPOLAR TYPE (H): ICD-10-CM

## 2018-05-30 ENCOUNTER — OFFICE VISIT - HEALTHEAST (OUTPATIENT)
Dept: BEHAVIORAL HEALTH | Facility: CLINIC | Age: 38
End: 2018-05-30

## 2018-05-30 DIAGNOSIS — F25.0 SCHIZOAFFECTIVE DISORDER, BIPOLAR TYPE (H): ICD-10-CM

## 2018-06-06 ENCOUNTER — OFFICE VISIT - HEALTHEAST (OUTPATIENT)
Dept: BEHAVIORAL HEALTH | Facility: CLINIC | Age: 38
End: 2018-06-06

## 2018-06-06 DIAGNOSIS — F25.0 SCHIZOAFFECTIVE DISORDER, BIPOLAR TYPE (H): ICD-10-CM

## 2018-06-19 ENCOUNTER — OFFICE VISIT - HEALTHEAST (OUTPATIENT)
Dept: BEHAVIORAL HEALTH | Facility: CLINIC | Age: 38
End: 2018-06-19

## 2018-06-19 DIAGNOSIS — G25.9 MOVEMENT DISORDER: ICD-10-CM

## 2018-06-19 DIAGNOSIS — E66.01 MORBID OBESITY (H): ICD-10-CM

## 2018-06-19 DIAGNOSIS — Z79.899 HIGH RISK MEDICATION USE: ICD-10-CM

## 2018-06-19 DIAGNOSIS — F25.0 SCHIZOAFFECTIVE DISORDER, BIPOLAR TYPE (H): ICD-10-CM

## 2018-06-19 ASSESSMENT — MIFFLIN-ST. JEOR: SCORE: 2032.57

## 2018-06-27 ENCOUNTER — OFFICE VISIT - HEALTHEAST (OUTPATIENT)
Dept: BEHAVIORAL HEALTH | Facility: CLINIC | Age: 38
End: 2018-06-27

## 2018-06-27 DIAGNOSIS — F25.0 SCHIZOAFFECTIVE DISORDER, BIPOLAR TYPE (H): ICD-10-CM

## 2018-07-18 ENCOUNTER — OFFICE VISIT - HEALTHEAST (OUTPATIENT)
Dept: BEHAVIORAL HEALTH | Facility: CLINIC | Age: 38
End: 2018-07-18

## 2018-07-18 DIAGNOSIS — F25.0 SCHIZOAFFECTIVE DISORDER, BIPOLAR TYPE (H): ICD-10-CM

## 2018-07-25 ENCOUNTER — OFFICE VISIT - HEALTHEAST (OUTPATIENT)
Dept: BEHAVIORAL HEALTH | Facility: CLINIC | Age: 38
End: 2018-07-25

## 2018-07-25 DIAGNOSIS — F25.0 SCHIZOAFFECTIVE DISORDER, BIPOLAR TYPE (H): ICD-10-CM

## 2018-09-18 ENCOUNTER — OFFICE VISIT - HEALTHEAST (OUTPATIENT)
Dept: BEHAVIORAL HEALTH | Facility: CLINIC | Age: 38
End: 2018-09-18

## 2018-09-18 DIAGNOSIS — Z79.899 HIGH RISK MEDICATION USE: ICD-10-CM

## 2018-09-18 DIAGNOSIS — F25.0 SCHIZOAFFECTIVE DISORDER, BIPOLAR TYPE (H): ICD-10-CM

## 2018-09-18 DIAGNOSIS — E66.01 MORBID OBESITY (H): ICD-10-CM

## 2018-09-18 ASSESSMENT — MIFFLIN-ST. JEOR: SCORE: 2018.96

## 2018-11-05 ENCOUNTER — COMMUNICATION - HEALTHEAST (OUTPATIENT)
Dept: BEHAVIORAL HEALTH | Facility: CLINIC | Age: 38
End: 2018-11-05

## 2018-12-19 ENCOUNTER — COMMUNICATION - HEALTHEAST (OUTPATIENT)
Dept: BEHAVIORAL HEALTH | Facility: CLINIC | Age: 38
End: 2018-12-19

## 2019-01-08 ENCOUNTER — OFFICE VISIT - HEALTHEAST (OUTPATIENT)
Dept: BEHAVIORAL HEALTH | Facility: CLINIC | Age: 39
End: 2019-01-08

## 2019-01-08 DIAGNOSIS — Z79.899 HIGH RISK MEDICATION USE: ICD-10-CM

## 2019-01-08 DIAGNOSIS — F25.0 SCHIZOAFFECTIVE DISORDER, BIPOLAR TYPE (H): ICD-10-CM

## 2019-01-08 ASSESSMENT — MIFFLIN-ST. JEOR: SCORE: 1991.74

## 2019-01-15 ENCOUNTER — COMMUNICATION - HEALTHEAST (OUTPATIENT)
Dept: BEHAVIORAL HEALTH | Facility: CLINIC | Age: 39
End: 2019-01-15

## 2019-02-11 ENCOUNTER — COMMUNICATION - HEALTHEAST (OUTPATIENT)
Dept: BEHAVIORAL HEALTH | Facility: CLINIC | Age: 39
End: 2019-02-11

## 2019-02-18 ENCOUNTER — RECORDS - HEALTHEAST (OUTPATIENT)
Dept: ADMINISTRATIVE | Facility: OTHER | Age: 39
End: 2019-02-18

## 2019-02-18 ENCOUNTER — COMMUNICATION - HEALTHEAST (OUTPATIENT)
Dept: BEHAVIORAL HEALTH | Facility: CLINIC | Age: 39
End: 2019-02-18

## 2019-02-20 ENCOUNTER — RECORDS - HEALTHEAST (OUTPATIENT)
Dept: ADMINISTRATIVE | Facility: OTHER | Age: 39
End: 2019-02-20

## 2019-03-11 ENCOUNTER — COMMUNICATION - HEALTHEAST (OUTPATIENT)
Dept: BEHAVIORAL HEALTH | Facility: CLINIC | Age: 39
End: 2019-03-11

## 2019-04-17 ENCOUNTER — COMMUNICATION - HEALTHEAST (OUTPATIENT)
Dept: BEHAVIORAL HEALTH | Facility: CLINIC | Age: 39
End: 2019-04-17

## 2019-05-03 ENCOUNTER — OFFICE VISIT - HEALTHEAST (OUTPATIENT)
Dept: BEHAVIORAL HEALTH | Facility: CLINIC | Age: 39
End: 2019-05-03

## 2019-05-03 ENCOUNTER — AMBULATORY - HEALTHEAST (OUTPATIENT)
Dept: BEHAVIORAL HEALTH | Facility: CLINIC | Age: 39
End: 2019-05-03

## 2019-05-03 ENCOUNTER — COMMUNICATION - HEALTHEAST (OUTPATIENT)
Dept: BEHAVIORAL HEALTH | Facility: CLINIC | Age: 39
End: 2019-05-03

## 2019-05-03 DIAGNOSIS — F25.0 SCHIZOAFFECTIVE DISORDER, BIPOLAR TYPE (H): ICD-10-CM

## 2019-05-03 DIAGNOSIS — Z79.899 HIGH RISK MEDICATION USE: ICD-10-CM

## 2019-05-03 ASSESSMENT — MIFFLIN-ST. JEOR: SCORE: 1978.13

## 2019-05-30 ENCOUNTER — COMMUNICATION - HEALTHEAST (OUTPATIENT)
Dept: BEHAVIORAL HEALTH | Facility: CLINIC | Age: 39
End: 2019-05-30

## 2019-07-02 ENCOUNTER — COMMUNICATION - HEALTHEAST (OUTPATIENT)
Dept: BEHAVIORAL HEALTH | Facility: CLINIC | Age: 39
End: 2019-07-02

## 2019-07-02 ENCOUNTER — OFFICE VISIT - HEALTHEAST (OUTPATIENT)
Dept: BEHAVIORAL HEALTH | Facility: CLINIC | Age: 39
End: 2019-07-02

## 2019-07-02 DIAGNOSIS — F25.9 SCHIZOAFFECTIVE DISORDER, CHRONIC CONDITION WITH ACUTE EXACERBATION (H): ICD-10-CM

## 2019-07-02 DIAGNOSIS — F66 SEXUAL DISINHIBITION: ICD-10-CM

## 2019-07-02 DIAGNOSIS — Z79.899 HIGH RISK MEDICATION USE: ICD-10-CM

## 2019-07-02 ASSESSMENT — MIFFLIN-ST. JEOR: SCORE: 2032.57

## 2019-07-09 ENCOUNTER — COMMUNICATION - HEALTHEAST (OUTPATIENT)
Dept: BEHAVIORAL HEALTH | Facility: CLINIC | Age: 39
End: 2019-07-09

## 2019-07-23 ENCOUNTER — COMMUNICATION - HEALTHEAST (OUTPATIENT)
Dept: BEHAVIORAL HEALTH | Facility: CLINIC | Age: 39
End: 2019-07-23

## 2019-08-01 ENCOUNTER — OFFICE VISIT - HEALTHEAST (OUTPATIENT)
Dept: BEHAVIORAL HEALTH | Facility: CLINIC | Age: 39
End: 2019-08-01

## 2019-08-01 DIAGNOSIS — F17.200 NICOTINE DEPENDENCE, UNCOMPLICATED, UNSPECIFIED NICOTINE PRODUCT TYPE: ICD-10-CM

## 2019-08-01 DIAGNOSIS — Z78.9 ALCOHOL USE: ICD-10-CM

## 2019-08-01 DIAGNOSIS — F25.0 SCHIZOAFFECTIVE DISORDER, BIPOLAR TYPE (H): ICD-10-CM

## 2019-08-01 DIAGNOSIS — Z71.89 ENCOUNTER FOR FAMILY CONFERENCE WITH PATIENT PRESENT: ICD-10-CM

## 2019-08-01 DIAGNOSIS — Z79.899 HIGH RISK MEDICATION USE: ICD-10-CM

## 2019-08-01 DIAGNOSIS — T88.7XXA DRUG SIDE EFFECTS: ICD-10-CM

## 2019-08-01 ASSESSMENT — MIFFLIN-ST. JEOR: SCORE: 2037.75

## 2019-11-12 ENCOUNTER — COMMUNICATION - HEALTHEAST (OUTPATIENT)
Dept: BEHAVIORAL HEALTH | Facility: CLINIC | Age: 39
End: 2019-11-12

## 2019-11-14 ENCOUNTER — OFFICE VISIT - HEALTHEAST (OUTPATIENT)
Dept: BEHAVIORAL HEALTH | Facility: CLINIC | Age: 39
End: 2019-11-14

## 2019-11-14 DIAGNOSIS — F17.200 NICOTINE DEPENDENCE, UNCOMPLICATED, UNSPECIFIED NICOTINE PRODUCT TYPE: ICD-10-CM

## 2019-11-14 DIAGNOSIS — Z79.899 HIGH RISK MEDICATION USE: ICD-10-CM

## 2019-11-14 DIAGNOSIS — E66.01 MORBID OBESITY (H): ICD-10-CM

## 2019-11-14 DIAGNOSIS — F25.0 SCHIZOAFFECTIVE DISORDER, BIPOLAR TYPE (H): ICD-10-CM

## 2019-11-14 DIAGNOSIS — T88.7XXA DRUG SIDE EFFECTS: ICD-10-CM

## 2019-11-14 DIAGNOSIS — Z71.89 ENCOUNTER FOR FAMILY CONFERENCE WITH PATIENT PRESENT: ICD-10-CM

## 2019-11-14 ASSESSMENT — ANXIETY QUESTIONNAIRES
1. FEELING NERVOUS, ANXIOUS, OR ON EDGE: NOT AT ALL
3. WORRYING TOO MUCH ABOUT DIFFERENT THINGS: NOT AT ALL
6. BECOMING EASILY ANNOYED OR IRRITABLE: NOT AT ALL
2. NOT BEING ABLE TO STOP OR CONTROL WORRYING: NOT AT ALL
5. BEING SO RESTLESS THAT IT IS HARD TO SIT STILL: NOT AT ALL
7. FEELING AFRAID AS IF SOMETHING AWFUL MIGHT HAPPEN: NOT AT ALL
4. TROUBLE RELAXING: NOT AT ALL
GAD7 TOTAL SCORE: 0

## 2019-11-14 ASSESSMENT — PATIENT HEALTH QUESTIONNAIRE - PHQ9: SUM OF ALL RESPONSES TO PHQ QUESTIONS 1-9: 0

## 2019-11-14 ASSESSMENT — MIFFLIN-ST. JEOR: SCORE: 2118.75

## 2019-12-04 ENCOUNTER — COMMUNICATION - HEALTHEAST (OUTPATIENT)
Dept: BEHAVIORAL HEALTH | Facility: CLINIC | Age: 39
End: 2019-12-04

## 2020-01-10 ENCOUNTER — COMMUNICATION - HEALTHEAST (OUTPATIENT)
Dept: BEHAVIORAL HEALTH | Facility: CLINIC | Age: 40
End: 2020-01-10

## 2020-01-10 DIAGNOSIS — F25.0 SCHIZOAFFECTIVE DISORDER, BIPOLAR TYPE (H): ICD-10-CM

## 2020-04-09 ENCOUNTER — OFFICE VISIT - HEALTHEAST (OUTPATIENT)
Dept: BEHAVIORAL HEALTH | Facility: CLINIC | Age: 40
End: 2020-04-09

## 2020-04-09 DIAGNOSIS — F25.0 SCHIZOAFFECTIVE DISORDER, BIPOLAR TYPE (H): ICD-10-CM

## 2020-04-09 DIAGNOSIS — F17.200 NICOTINE DEPENDENCE, UNCOMPLICATED, UNSPECIFIED NICOTINE PRODUCT TYPE: ICD-10-CM

## 2020-04-09 DIAGNOSIS — Z79.899 HIGH RISK MEDICATION USE: ICD-10-CM

## 2020-04-09 DIAGNOSIS — E66.01 MORBID OBESITY (H): ICD-10-CM

## 2020-04-09 DIAGNOSIS — Z71.89 ENCOUNTER FOR FAMILY CONFERENCE WITH PATIENT PRESENT: ICD-10-CM

## 2020-04-09 ASSESSMENT — MIFFLIN-ST. JEOR: SCORE: 2009.89

## 2020-04-10 ASSESSMENT — ANXIETY QUESTIONNAIRES
1. FEELING NERVOUS, ANXIOUS, OR ON EDGE: NOT AT ALL
6. BECOMING EASILY ANNOYED OR IRRITABLE: NOT AT ALL
IF YOU CHECKED OFF ANY PROBLEMS ON THIS QUESTIONNAIRE, HOW DIFFICULT HAVE THESE PROBLEMS MADE IT FOR YOU TO DO YOUR WORK, TAKE CARE OF THINGS AT HOME, OR GET ALONG WITH OTHER PEOPLE: NOT DIFFICULT AT ALL
3. WORRYING TOO MUCH ABOUT DIFFERENT THINGS: NOT AT ALL
2. NOT BEING ABLE TO STOP OR CONTROL WORRYING: NOT AT ALL
7. FEELING AFRAID AS IF SOMETHING AWFUL MIGHT HAPPEN: NOT AT ALL
5. BEING SO RESTLESS THAT IT IS HARD TO SIT STILL: NOT AT ALL
GAD7 TOTAL SCORE: 0
4. TROUBLE RELAXING: NOT AT ALL

## 2020-04-10 ASSESSMENT — PATIENT HEALTH QUESTIONNAIRE - PHQ9: SUM OF ALL RESPONSES TO PHQ QUESTIONS 1-9: 0

## 2020-07-14 ENCOUNTER — COMMUNICATION - HEALTHEAST (OUTPATIENT)
Dept: BEHAVIORAL HEALTH | Facility: CLINIC | Age: 40
End: 2020-07-14

## 2020-07-14 DIAGNOSIS — F25.0 SCHIZOAFFECTIVE DISORDER, BIPOLAR TYPE (H): ICD-10-CM

## 2020-11-17 ENCOUNTER — COMMUNICATION - HEALTHEAST (OUTPATIENT)
Dept: BEHAVIORAL HEALTH | Facility: CLINIC | Age: 40
End: 2020-11-17

## 2021-01-12 ENCOUNTER — COMMUNICATION - HEALTHEAST (OUTPATIENT)
Dept: BEHAVIORAL HEALTH | Facility: CLINIC | Age: 41
End: 2021-01-12

## 2021-01-12 DIAGNOSIS — F25.0 SCHIZOAFFECTIVE DISORDER, BIPOLAR TYPE (H): ICD-10-CM

## 2021-03-26 ENCOUNTER — COMMUNICATION - HEALTHEAST (OUTPATIENT)
Dept: BEHAVIORAL HEALTH | Facility: CLINIC | Age: 41
End: 2021-03-26

## 2021-05-26 ASSESSMENT — PATIENT HEALTH QUESTIONNAIRE - PHQ9: SUM OF ALL RESPONSES TO PHQ QUESTIONS 1-9: 0

## 2021-05-27 ASSESSMENT — PATIENT HEALTH QUESTIONNAIRE - PHQ9: SUM OF ALL RESPONSES TO PHQ QUESTIONS 1-9: 0

## 2021-05-27 NOTE — TELEPHONE ENCOUNTER
RYLAN Muller from [facility] contacted the clinic. They are planning to discharge the pt this week or early next week. RYLAN is trying to coordinate the Prolixin injection administration. Pt is due for next injection on April 23 rd, they can order the injection but do not have anybody to administer it. Pt was scheduled with a nurse in the clinic on 5/16/19, this writer spoke to provider yesterday and as per her verbal directive pt needs to be evaluated by MD before prescribing any medications. Explained to Renzo that according to our policy injectable drugs have to be ordered from the in - house pharmacy, Dr. Johnson is currently booked until end of June, offered to schedule first available appt. with provider.   RYLAN is wondering if Dr. Johnson will be willing to discuss the case with provider at [facility], gave me the nursing station phone # [phone].

## 2021-05-28 ASSESSMENT — ANXIETY QUESTIONNAIRES
GAD7 TOTAL SCORE: 0
GAD7 TOTAL SCORE: 0

## 2021-05-28 NOTE — TELEPHONE ENCOUNTER
Spoke to Randa at infusion center, she found the order and will be contacting the pt to set up an appt for next injection; verified with Randa that all orders have to be send to their in house pharmacy and updated with our contact info; explained Dr. Johnson is his outpatient psychiatrist and they need to contact us for future RF.  - Called [facility] and cancelled the prescription for Plolixin injection sent on 5/6/19

## 2021-05-28 NOTE — TELEPHONE ENCOUNTER
Mother called stating that pt received the Fluphenazine injection yesterday at [facility] infusion center; he is due for next injection in two weeks but they will not schedule him because injection ordered for one time only. Mother not sure what they need from us - an order or prescription, asked this writer to contact them for future directive.  They prefer to continue injections at [facility] (closer to home)    Reviewed his record, Dr. Johnson already sent Rx for 6 month to [facility].  fluPHENAZine decanoate (PROLIXIN) 25 mg/mL injection 1 mL 12 5/6/2019  No   Sig - Route: Inject 1 mL (25 mg total) into the shoulder, thigh, or buttocks every 14 (fourteen) days. - Intramuscular   Sent to pharmacy as: fluPHENAZine decanoate (PROLIXIN) 25 mg/mL injection       Left VM for [facility] @ [phone] asking to give us a call back to clarify what they need from us in order to continue his injections.

## 2021-05-28 NOTE — TELEPHONE ENCOUNTER
Clarified with provider - pt no longer takes oral Prolixin ( 5 mg ) - called [facility] and d/c'd prescription for Prolixin tabs. Spoke to Pippa. Med list updated in Epic.

## 2021-05-28 NOTE — TELEPHONE ENCOUNTER
RECEIVED DISCHARGE SUMMARY FROM [place] DEPT OF HUMAN SERVICES FOR DATE OF ADMISSION OF 04/01/2019.  ALSO RECEIVED 'MY AFTERCARE PLAN'.    PATIENT HAS CLINIC APPOINTMENT WITH PROVIDER TODAY - WILL FORWARD TO PROVIDER WHEN PATIENT CHECKS IN FOR APPOINTMENT

## 2021-05-28 NOTE — TELEPHONE ENCOUNTER
Spoke to his mother Crista yesterday, she was asking to set up an appt for injection for the pt, explained that pt needs to be assessed by provider first, Dr. Johnson is out for two weeks and booked until July; updated that it was discussed with his SW at [place] but mother insisted on calling him again.

## 2021-05-28 NOTE — TELEPHONE ENCOUNTER
Attempted to contact the provider at [place] on April 19 at the telephone number specified below [phone].  I was transferred to their voicemail.  I did not get the name of the provider.  I left a message with my contact information so they can call me to discuss Waylon's disposition plan. EG

## 2021-05-28 NOTE — PROGRESS NOTES
May 3, 2019  No contact encounter  I spent additional 40 minutes today prior to the encounter with the patient to review extensive medical records a total of 75 pages of outside records pertinent to his 2 last psychiatric admissions  February 18, 2019 to April 1, 2019 at [facility], and then hospitalization at the [facility] April 1, 2019 to April 25, 2019.  The patient was admitted for severe exacerbation of psychosis and aggressive behavior towards parents.  He was eventually stabilized on oral Prolixin which was replaced with Prolixin Depo 25 mg biweekly last injection on April 26.  Sue Johnson MD

## 2021-05-28 NOTE — PROGRESS NOTES
Patient here for culturally sensitive psychiatric medication management.   Correct pharmacy verified with patient and confirmed in snapshot? [x] yes []no    Charge captured ? [] yes  [x] no    Medications Phoned  to Pharmacy [] yes [x]no  Name of Pharmacist:  List Medications, including dose, quantity and instructions      Medication Prescriptions given to patient   [] yes  [] no   List the name of the drug the prescription was written for.       Medications ordered this visit were e-scribed.  Verified by order class [x] yes  [] no   prolixin 25mg, injection  Medication changes or discontinuations were communicated to patient's pharmacy: [] yes  [x] no    UA collected [] yes  [x] no    [place] Prescription Monitoring Program Reviewed? [] yes  [x] no    Referrals were made to:  Yes, psychotherapy, physical therapy    Future appointment was made: [x] yes  [] no    Dictation completed at time of chart check: [] yes  [x] no    I have checked the documentation for today s encounters and the above information has been reviewed and completed.

## 2021-05-28 NOTE — TELEPHONE ENCOUNTER
RF request for syringes - disregarded, Fluphenazine has to be ordered from the [facility]'s in house pharmacy.    Late entry:  According to his mother, pt has been off of the oral Prolixin but it is still on his med list

## 2021-05-29 NOTE — TELEPHONE ENCOUNTER
Mother Crista contacted [facility] requested to speak with a nurse. 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 Today pt went to [place] trying to find the [facility] because he was actually looking for a company nearby could possible hire him as a .   Mother wanted to clarify if Dr. 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    Waylon contacted this clinic twice requesting to speak with this writer right away. Pt says he is feeling wonderful, has no complains regarding mental health and called because he is looking for the hospital he was in , said he wants to visit his friends. Pt said his mother is aware he went to [place] but he would prefer this writer not to contact her.  Pt was provided with [facility] address.     Discussed with provider: As per verbal order:  - MD does not recommend the pt to drive  - Advise the mother to bring the pt to the ED if symptoms of david    Phone call made to Crista and explained that doctor does not recommend Waylon to drive, also told his mother to bring him to the ED if pt hyper verbal, has rapid speech, lacking of sleep, having unusual thought, acting outside of his baseline, or putting himself or other in danger. Mother said she haven't noticed anything like this except he wants to drive a truck and smokes a lot.

## 2021-05-29 NOTE — TELEPHONE ENCOUNTER
Noted.  I again strongly recommend the patient to look for other occupation then professional .  As discussed on multiple previous occasions during visits.  Agree with the crisis plan and instructions.EG

## 2021-05-30 NOTE — TELEPHONE ENCOUNTER
Left VM stating that pt was admitted to the hospital today and will not come for injection tomorrow.

## 2021-05-30 NOTE — PROGRESS NOTES
"Pt is here for culturally sensitive psychiatric med management follow up and accompanied by his mother.  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  He reports \" excellent mood\"  Sleep:Reversed biological clock, per mother snoring very loud  Pt has been smoking 2 PPD and started drinking 2-3 beers a day   He moved back with his parents  Pt is delusional, his checking account blocked and is now under investigation because he was trying to deposit money he said he got from the girlfriend he met online. Pt is saying he wants to have two wifes, doing online dating.   Asking to stop vit B12 due to discomfort in his head.  Constantly interrupting his mother, gets agitated and angry with her;  raising his voice, said she causing problems for him.   Per mother, he is due for his next injection tomorrow, pt wants to continue with Prolixin    Pt was placed on a peace office's hold and escorted to ER by this nurse, RN, and security.   Chart check: NA        "

## 2021-05-30 NOTE — PROGRESS NOTES
OUTPATIENT PROGRESS NOTE      Date of visit July 2, 2019              History of present illness/Subjective:  The patient is Singaporean speaking. The interview is conducted in Singaporean language, translated personally by me into English records which adds additional element of complexity to this visit and my assessment. The patient comes accompanied by mother who is present for interview.  They say that the patient is compliant with injectable Prolixin and is due for injection tomorrow.  He is with apparent exacerbation of symptoms of david and psychosis today despite compliance, likely because he is smoking 2 packs/day and drinking 2-3 beer daily which interferes with metabolism of Prolixin.  He is more orders.  He is staring me down in an intimidating fashion.  He is apparently internally stimulated but denies auditory hallucinations.  He calls me in Singaporean in a quiet diminutive way aunt Gisell which is also different na from his baseline as he used to refer to me politely Dr. Johnson.  He worries that vitamin B12 causes some sort of discomfort in his head like some extra air is inside of his head, and he wants remove the extra air from the inside of his skull.  Of note, he had somatic delusions in the past when he tried to cut of his thumb.  He is spending excessively, recently bought a brand-new [employer], and new jewelry.  He is dating excessively on the Internet.  His bank account was recently blocked and is on the investigation now, as he received the $122,000 transfer from a person he met on Internet, which is believed to be stolen money per his report.  He is up all night.  He is described as irritable.  His mother confirms the above information.  He does not want to stay in the hospital voluntary as per my recommendation for adjustment of medications.  He is under the orders of commitment, and currently is on provisional discharge from a state hospital.  He does not follow the provisions of discharge requiring  "him to maintain sobriety.  So decision was made to initiate a hold with intent to revoke his provisional discharge and psychiatric admission for stabilization and safety, especially due to the most recent history of aggressive behavior towards his mother during exacerbation of david and psychosis eventually leading to commitment last winter.    As per nursing report, the patient was observed easily agitated and angry with his mother during the interview, repeatedly raising his voice, accusing mother in causing him problems.    Medications:      Current Outpatient Medications   Medication Sig Dispense Refill     CYANOCOBALAMIN, VITAMIN B-12, (VITAMIN B12 ORAL) Take 500 mg by mouth daily.        fluPHENAZine decanoate (PROLIXIN) 25 mg/mL injection Inject 1 mL (25 mg total) into the shoulder, thigh, or buttocks every 14 (fourteen) days. 1 mL 12     No current facility-administered medications for this visit.          Family history/Social history  Unemployed, he lost his job due to exacerbation.          Procedures: Complex visit due to severe exacerbation and coordination of security, hold, and transfer to the emergency room.  Total time today is 45 minutes, face-to-face and direct hands on patient's care and psychiatric unit, more than 50% coordination of psychiatric admission and review of safety issues with the patient's mother and the staff.       Vital Signs:    /88 (Patient Site: Left Arm, Patient Position: Sitting, Cuff Size: Adult Large)   Pulse (!) 103   Temp 97.1  F (36.2  C) (Oral)   Ht 5' 6\" (1.676 m)   Wt (!) 259 lb (117.5 kg)   BMI 41.80 kg/m      Mental Status Examination: Appears intense, internally stimulated, vigilant, alert and oriented x3.  Attention and concentration are distracted due to internal stimulation.  He is occasional laughing to himself out of the context of interview.  Speech rather loud, mood described as very good.  Affect intense, irritable.  Thought processing logical " concrete.  Associations tight.  Thought content no SI/HI, but obvious symptoms of paranoia and likely auditory hallucinations, and somatic delusions.  Fund of knowledge and language without gross abnormalities.  Psychomotor activity without gross abnormalities.  Gait and station are stable.  Short-term and long-term memory without gross abnormalities.  Insight and judgment are poor.        Laboratory Data:     personally reviewed.   No visits with results within 2 Month(s) from this visit.   Latest known visit with results is:   Admission on 07/16/2016, Discharged on 08/18/2016   Component Date Value     Bilirubin, Total 07/27/2016 0.4      Bilirubin, Direct 07/27/2016 0.1      Protein, Total 07/27/2016 7.6      Albumin 07/27/2016 3.9      Alkaline Phosphatase 07/27/2016 91      AST 07/27/2016 20      ALT 07/27/2016 48*     WBC 07/27/2016 7.8      RBC 07/27/2016 5.11      Hemoglobin 07/27/2016 14.7      Hematocrit 07/27/2016 44.3      MCV 07/27/2016 87      MCH 07/27/2016 28.8      MCHC 07/27/2016 33.2      RDW 07/27/2016 12.5      Platelets 07/27/2016 291      MPV 07/27/2016 10.1      Neutrophils % 07/27/2016 63      Lymphocytes % 07/27/2016 29      Monocytes % 07/27/2016 6      Eosinophils % 07/27/2016 2      Basophils % 07/27/2016 1      Neutrophils Absolute 07/27/2016 4.9      Lymphocytes Absolute 07/27/2016 2.3      Monocytes Absolute 07/27/2016 0.5      Eosinophils Absolute 07/27/2016 0.1      Basophils Absolute 07/27/2016 0.0          Diagnosis:  Past Medical History:   Diagnosis Date     Bipolar I disorder (H)      Calcified granuloma of lung (H) 2014     Obesity (BMI 35.0-39.9 without comorbidity)      Paranoid schizophrenia (H)      Vitamin B 12 deficiency        Patient Active Problem List   Diagnosis     Mood disorder in conditions classified elsewhere     Left arm pain     Dandruff     Other dysfunctions of sleep stages or arousal from sleep     Weight gain     Back ache     Clinical depression      Chronic paranoid schizophrenia (H)     Neuroleptic consent form discussed and signed: July 17, 2016       Visit diagnosis: Schizoaffective disorder, chronic, with acute exacerbation of psychosis and david.  Sexual disinhibition.  Insomnia as in reversed sleep pattern likely due to david.  High risk medication use.        Treatment Plan:        Please of his hold was initiated.  The patient will be transferred by security to emergency room for psychiatric admission and revocation of provisional discharge.     This note was created by undersigned using a Dragon dictation system. All typing errors or contextual distortion are unintentional and software inherent.     Sue Johnson MD

## 2021-05-30 NOTE — TELEPHONE ENCOUNTER
- Mother contacted the clinic to verify if the pt was set up for the next injection. Mother said she has no idea if it was scheduled at all at discharge, told that they prefer to go back to the infusion center in [facility].    Reviewed records, last injection given while he was inpatient on 7/3/19 ( IM, RD) pt should be getting it every two weeks. No documentation about future appt for injection found.     Contacted the infusion center and made an appt for July 17 th @2 pm. Spoke to Randa. Mother and the pt notified. Randa said they will need a new order for his next injection in August ( has a f/u with Alex on 8/1/19)  Discharche summary and documentation of the last Prolixin documentation faxed to infusion center.

## 2021-05-30 NOTE — TELEPHONE ENCOUNTER
Discharge records from 7/23/19 received via fax from [facility].  Labeled and placed in Dr. Johnson's mailbox for review.

## 2021-05-31 VITALS — WEIGHT: 245 LBS | BODY MASS INDEX: 39.37 KG/M2 | HEIGHT: 66 IN

## 2021-05-31 VITALS — BODY MASS INDEX: 40.66 KG/M2 | WEIGHT: 253 LBS | HEIGHT: 66 IN

## 2021-05-31 VITALS — BODY MASS INDEX: 40.02 KG/M2 | HEIGHT: 66 IN | WEIGHT: 249 LBS

## 2021-05-31 NOTE — PROGRESS NOTES
OUTPATIENT PROGRESS NOTE      Date of visit August 1, 2019         Reasons For Visit Are Multiple Today:   1.  Culturally sensitive follow-up for mental health issues  2.  Follow-up with psychosis: No symptoms today, resolved  3.  Follow-up for david: No symptoms, resolved  4.  Follow-up with sexual disinhibition: No symptoms, resolved  5.  Follow-up for schizoaffective disorder: Asymptomatic at this time  6.  Collateral information: Obtained from his mother and father  7.  Family conference: Held with his mother present to discuss diagnosis, Ms. use of cigarettes and alcohol, safety issues, response to medications, future follow-up 8.  [place] cultural issues: The patient is originally from [facility], his mother is going to travel there in September, the patient would like to go with her, but can not as he spent all his money during recent manic episode  9.  High risk medication use: The patient is on Prolixin, improved response since he stopped drinking beer daily and decreased amount of cigarettes to 1 pack/day  10.  Review of the most recent psychiatric hospitalization on July 2  11.  Review of inventories: PHQ 9 score 0 PHQ 9 score 0 SULLY-7 score 0  12.  Renewal of prescriptions  13.  Medication side effects: The patient says that he does not want to continue B12 vitamin supplement as it causes him excessive sexual arousal the so I think it is okay to discontinue it the patient self stopped the trazodone as his sleep is normal at this time.      History of present illness/Subjective:  The patient is [place] speaking. The interview is conducted in [place] language, translated personally by me into English records which adds additional element of complexity to this visit and my assessment. The patient comes accompanied by mother and father, mother is present for interview as per patient's request.  Reports as above.  The patient appears to be calm.  Adequate eye contact.  No behavioral stated evidence of mood  symptoms or symptoms of psychosis.  No side effects with medications.  His mother confirms that the patient is doing well at this time.  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            Medications:      Current Outpatient Medications   Medication Sig Dispense Refill     fluPHENAZine decanoate (PROLIXIN) 25 mg/mL injection Inject 1 mL (25 mg total) into the shoulder, thigh, or buttocks every 14 (fourteen) days. 1 mL 12     latanoprost (XALATAN) 0.005 % ophthalmic solution Administer 1 drop to both eyes at bedtime.       No current facility-administered medications for this visit.          Family history/Social history  The patient is not a drug use it.  He smokes cigarettes.  He lives with his parents.  He is unemployed on SSDI at this time.  His  referred him to a rep payee.          Procedures: Complex visit as multiple issues were addressed, total of  13.  Total time today is 40 minutes, face to face and direct hands on patient's care in the clinic, more than 50% of time was coordination of care, multiple issues were addressed as reflected above, including review of the most recent hospitalization psychiatric hospitalization and also ER visit.   1. Coordination of care: nursing notes, vital signs,  communication with the pharmacy, and  medication orders were reviewed signed and discussed with the patient. Multiple chart entries were reviewed in preparation of documentation and generation of documentation.  2.  Education: was provided on diagnosis, medication regimen, psychotherapeutic treatment modalities, future follow-up appointments.  3.  Counseling: was provided on coping with mental illness.  4.  Coordination of care: I personally coordinated all medication orders, follow up orders, pharmacy  "requests, and provided the patient with detailed instructions in both Cameroonian and English languages  5. Cam[place]onian cultural and immigration issues were addressed, the interview was conducted in Cameroonian language  6. Collateral information was obtained from mother and father  7. Family conference was held to discuss the above issues      Review Of Systems:  As above.  The reminder of 10 systems was negative.      Vital Signs:    /82   Pulse (!) 111   Ht 5' 6\" (1.676 m)   Wt (!) 260 lb 2.3 oz (118 kg)   BMI 41.99 kg/m      Mental Status Examination:     Appearance    appears calm.  Alert and oriented x3.  Attention and concentration are normal.  Speech monosyllabic which is usually his baseline.  Mood described as good.  Affect neutral.  Thought processing logical.  Associations tight.  Thought content no SI/HI/psychosis.  Language normal.  Short-term and long-term memory without gross abnormalities.  Fund of knowledge is average.  Psychomotor activity without gross abnormalities.  Gait and station are normal.  Insight and judgment are fair.     Laboratory Data:     personally reviewed.   Admission on 07/02/2019, Discharged on 07/08/2019   Component Date Value     VENTRICULAR RATE 07/05/2019 93      ATRIAL RATE 07/05/2019 93      P-R INTERVAL 07/05/2019 138      QRS DURATION 07/05/2019 84      Q-T INTERVAL 07/05/2019 366      QTC CALCULATION (BEZET) 07/05/2019 455      P Axis 07/05/2019 69      R AXIS 07/05/2019 64      T AXIS 07/05/2019 41      MUSE DIAGNOSIS 07/05/2019                      Value:Normal sinus rhythm  Normal ECG  When compared with ECG of 14-JUL-2016 16:29,  No significant change was found  Confirmed by RAE VANCE, LES LOC:YORDAN ([id number]) on 7/8/2019 2:29:47 PM     Admission on 07/02/2019, Discharged on 07/02/2019   Component Date Value     Alcohol, Blood 07/02/2019 <10      Bilirubin, Total 07/02/2019 0.3      Bilirubin, Direct 07/02/2019 0.1      Protein, Total 07/02/2019 7.9      Albumin " 07/02/2019 4.3      Alkaline Phosphatase 07/02/2019 76      AST 07/02/2019 24      ALT 07/02/2019 36      Lipase 07/02/2019 29      Amphetamines 07/02/2019 Screen Negative      Benzodiazepines 07/02/2019 Screen Negative      Opiates 07/02/2019 Screen Negative      Phencyclidine 07/02/2019 Screen Negative      THC 07/02/2019 Screen Negative      Barbiturates 07/02/2019 Screen Negative      Cocaine Metabolite 07/02/2019 Screen Negative      Methadone 07/02/2019 Screen Negative      Oxycodone 07/02/2019 Screen Negative      Creatinine, Urine 07/02/2019 161.9      WBC 07/02/2019 11.4*     RBC 07/02/2019 5.84      Hemoglobin 07/02/2019 16.5      Hematocrit 07/02/2019 49.9      MCV 07/02/2019 85      MCH 07/02/2019 28.3      MCHC 07/02/2019 33.1      RDW 07/02/2019 14.4      Platelets 07/02/2019 322      MPV 07/02/2019 10.1      Neutrophils % 07/02/2019 64      Lymphocytes % 07/02/2019 23      Monocytes % 07/02/2019 6      Eosinophils % 07/02/2019 7*     Basophils % 07/02/2019 1      Neutrophils Absolute 07/02/2019 7.2      Lymphocytes Absolute 07/02/2019 2.6      Monocytes Absolute 07/02/2019 0.7      Eosinophils Absolute 07/02/2019 0.8*     Basophils Absolute 07/02/2019 0.1          Diagnosis:  Past Medical History:   Diagnosis Date     Bipolar I disorder (H)      Calcified granuloma of lung (H) 2014     Obesity (BMI 35.0-39.9 without comorbidity)      Paranoid schizophrenia (H)      Vitamin B 12 deficiency        Patient Active Problem List   Diagnosis     Mood disorder in conditions classified elsewhere     Left arm pain     Dandruff     Other dysfunctions of sleep stages or arousal from sleep     Weight gain     Back ache     Clinical depression     Chronic paranoid schizophrenia (H)     Neuroleptic consent form discussed and signed: July 17, 2016     Schizoaffective disorder (H)     Psychosis, unspecified psychosis type (H)       Visit diagnosis: Schizoaffective disorder, bipolar type, stable at this time in  remission.  Nicotine dependence.  Alcohol use.  High risk medication use.  Encounter for a family conference.  Side effects with medications.    Treatment Plan:  1. The patient was provided with education and detailed written and verbal instructions in native language on diagnosis, future follow-up, and treatment plan, same instructions were also provided in English language.   2. Instructed to return to clinic in 3 months or sooner if needed.  3. Nurse only clinic is available in the interim if needed.  4. Crisis plan in place.  5. Referral to a culturally sensitive Russian speaking therapist Dr. Albarran was provided.  6.  Discontinue B12 due to side effects.  Discontinue trazodone as no need for it right now.            This note was created by andres using a Dragon dictation system. All typing errors or contextual distortion are unintentional and software inherent.     Sue Johnson MD

## 2021-05-31 NOTE — PROGRESS NOTES
Patient here for culturally sensitive psychiatric medication management. States last injection was yesterday. Sates things are good since discharge, denies SI/HI.    PHQ-0  SULLY-0

## 2021-05-31 NOTE — PROGRESS NOTES
Correct pharmacy verified with patient and confirmed in snapshot? [x] yes []no    Charge captured ? [x] yes  [] no    Medications Phoned  to Pharmacy [] yes [x]no  Name of Pharmacist:  List Medications, including dose, quantity and instructions      Medication Prescriptions given to patient   [] yes  [x] no   List the name of the drug the prescription was written for.   Printed rx was faxed to [facility]-paper copy destroyed     Medications ordered this visit were e-scribed.  Verified by order class [] yes  [x] no    Medication changes or discontinuations were communicated to patient's pharmacy: [] yes  [x] no    UA collected [] yes  [x] no    [place] Prescription Monitoring Program Reviewed? [] yes  [x] no    Referrals were made to: none     Future appointment was made: [x] yes  [] no    Dictation completed at time of chart check: [x] yes  [] no    I have checked the documentation for today s encounters and the above information has been reviewed and completed.

## 2021-06-01 VITALS — WEIGHT: 259 LBS | BODY MASS INDEX: 41.62 KG/M2 | HEIGHT: 66 IN

## 2021-06-01 VITALS — WEIGHT: 251 LBS | HEIGHT: 66 IN | BODY MASS INDEX: 40.34 KG/M2

## 2021-06-02 VITALS — HEIGHT: 66 IN | WEIGHT: 250 LBS | BODY MASS INDEX: 40.18 KG/M2

## 2021-06-02 VITALS — BODY MASS INDEX: 41.14 KG/M2 | WEIGHT: 256 LBS | HEIGHT: 66 IN

## 2021-06-03 VITALS — HEIGHT: 66 IN | BODY MASS INDEX: 39.7 KG/M2 | WEIGHT: 247 LBS

## 2021-06-03 VITALS — BODY MASS INDEX: 41.62 KG/M2 | HEIGHT: 66 IN | WEIGHT: 259 LBS

## 2021-06-03 VITALS — WEIGHT: 260.14 LBS | BODY MASS INDEX: 41.81 KG/M2 | HEIGHT: 66 IN

## 2021-06-03 NOTE — PROGRESS NOTES
Pt is here for culturally sensitive psychiatric med management follow up and accompanied by his mother. Pt reports no new concerns today, says he is doing well.  Sleep, appetite, mood: No c/o despite pt put on 18 lbs since last visit.  Has online girlfriend, planning to get . Gets irritated and agitated when asked for more information, gets suspicious and defensive.   Says he enjoys working.   Pt reports smoking 0.5 PPD.    Correct pharmacy verified with patient and confirmed in snapshot? [x] yes []no    Charge captured ? [x] yes  [] no    Medications Phoned  to Pharmacy [] yes [x]no  Name of Pharmacist:  List Medications, including dose, quantity and instructions      Medication Prescriptions given to patient   [] yes  [x] no   List the name of the drug the prescription was written for.       Medications ordered this visit were e-scribed.  Verified by order class [] yes  [x] no   Order for Prolixin IM faxed to [facility]  Medication changes or discontinuations were communicated to patient's pharmacy: [] yes  [x] no    UA collected [] yes  [x] no    [place] Prescription Monitoring Program Reviewed? [] yes  [x] no    Referrals were made to:  See telephone encounter from 11/12/19    Future appointment was made: [x] yes  [] no  4/9/20  Dictation completed at time of chart check: [] yes  [x] no    I have checked the documentation for today s encounters and the above information has been reviewed and completed.

## 2021-06-03 NOTE — TELEPHONE ENCOUNTER
Mother called to give us some updates before an upcoming appt on 11/14/19. Crista says  that Waylon is doing better, he is now working for a [employer] as a  and doing OK. Pt resides with his parents.   She is complaining of him:  - eating a lot  - neglecting personal hygiene  - smoking a lot  - continues with online dating  - sends all his money to people he have never met in person.  - Pt being banned from [employer].  Recently took a picture of his new bank card and sent it to online girlfriend.

## 2021-06-03 NOTE — TELEPHONE ENCOUNTER
RECEIVED REP-PAYEE PAPERWORK FROM [facility] - ADULT MENTAL HEALTH SERVICES    [facility]    [address]    [place]  [address]     P: [phone]   F: [phone]           ALLAN CEBALLOS -           P:  [phone]  F: [phone]  PLACED IN PROVIDER'S CLINIC MAILBOX

## 2021-06-03 NOTE — PROGRESS NOTES
OUTPATIENT PROGRESS NOTE      Date of visit November 14, 2019     There have not been many changes since last visit on 5/3/19  in chief complaint, reasons for visit, subjective, objective, nursing report, coordination of care, procedures, mental status examination, visit diagnosis, and treatment plan, so the note was partially copied from that date.         Mother Ilene Nichole      Reasons For Visit Are Multiple Today:   1.  Culturally sensitive follow-up for mental health issues  2.   Review of psychiatric symptoms  3.  Family conference: The patient comes accompanied by his mother who provides collateral information and is present for interview per patient's request  4.  Collateral information from his mother  5.  Excessive spending  6. I discussed explicitly the need for a rep payee and referral   7.  Review of safety issues: Due to patient's work as a :  time was spent on this issue again during family conference to advise the patient that schizoaffective disorder may cause some baseline subtle limitations in cognitive and executive function , such as coordination, attention, concentration, speed of processing and speed of reaction thus being a  may be not the safest occupation for him.  He insists that he wants to work as a  and that he passed the DOT clearance .     6.  Multiple questions: The patient and his mother have multiple questions as to smoking cessation, prescriptions, and psychiatric follow-up.  All questions were answered  8.  High risk medication use: The patient is on chronic prescription of Prolixin injections every 2 weeks  9.   Surinamese cultural issues: The interview is in the Surinamese language  10.   Smoking cessation: Again discussed her in detail.  Especially due to concern that smoking may be decreasing Prolixin concentration which may in turn contribute to recurrence of symptoms.  We discussed use of Topamax which also may help him with weight  loss, they have no family history of kidney stones.  Option of nicotine gum or patches.  I do not think Wellbutrin is a good option for him due to risk of precipitation of david. Unfortunately  the patient declines all options for smoking cessation at this time  11.  Renewal of prescriptions  12.  Education and coordination of future follow-up appointments  13.  High risk medication use: Discussed test was completed score 3.  Stable.  14.   Inventories: PHQ 9 score 0, SULLY 7 score 0  15.  Discussion of healthy eating habits and lifestyle modifications.      History of present illness/Subjective:  The patient is Bruneian speaking. The interview is conducted in Bruneian language, translated personally by me into English records which adds additional element of complexity to this visit and my assessment.  Today  the patient denies all psychiatric symptoms today including voices, visions, paranoia, delusional ideas, mood symptoms, side effects with medications problems with sleep.   No behavioral stated evidence today of mood symptoms of psychosis, though patient continues to have a very limited insight   Into his physical and mental health limitations at baseline.  The patient's mother still concerned that his judgment remains limited, that his spending is excessive, she agrees that he may benefit from somebody helping him to manage his funds.  She is also concerned about his excessive smoking, and we discussed that that may decrease the concentration of Prolixin necessitating titration of the dose, and we discussed treatment for nicotine addiction as reflected above..  She is also concerned of his weight gain, and we discussed healthy eating habits, but the patient insists that he already eats quite well and healthy.  She says that otherwise he is doing well and has no other signs or symptoms.       Medications:      Current Outpatient Medications   Medication Sig Dispense Refill     fluPHENAZine decanoate (PROLIXIN) 25  mg/mL injection Inject 1 mL (25 mg total) into the shoulder, thigh, or buttocks every 14 (fourteen) days. 1 mL 12     latanoprost (XALATAN) 0.005 % ophthalmic solution Administer 1 drop to both eyes at bedtime.       No current facility-administered medications for this visit.          Family history/Social history   the patient lives with his parents.  He is unemployed at this time, on SSDI receiving $1000 per month.  He started smoking.  He is not a drug with alcohol user.  He is recently .  His divorce is likely contributed to the above exacerbation of mental health issues.          Procedures: Complex visit as multiple issues were addressed, total of 15.  Total time today is 45 minutes, face to face and direct hands on patient's care in the clinic, more than 50% of time was coordination of care including extensive collateral information from the patient's mother,  family conference with the patient  Present,  medications and medication orders, coordination of follow-up   appointment.  1. Coordination of care: nursing notes, vital signs,  communication with the pharmacy, and  medication orders were reviewed signed and discussed with the patient. Multiple chart entries were reviewed in preparation of documentation and generation of documentation.  2.  Education: was provided on diagnosis, medication regimen, psychotherapeutic treatment modalities, future follow-up appointments.  3.  Counseling: was provided on coping with mental illness, safety issues, professional rehabilitation.  4.  Coordination of care: I personally coordinated all medication orders, follow up orders, pharmacy requests, and provided the patient with detailed instructions in his native language  5. Mosotho cultural and immigration issues were addressed, the interview was conducted in Mosotho language   6. Collateral information was obtained from mother  7. Family conference was held to discuss safety issues and referrals and medication  "regimen, smoking cessation, lifestyle modification, weight control, and the importance of compliance.       Review Of Systems:  As above.   He denies any particular side effects with medications except a very vague complaint that he \"just does not like it in his stomach \".  The reminder of 10 systems was negative.      Vital Signs:    /81 (Patient Site: Left Arm, Patient Position: Sitting, Cuff Size: Adult Large)   Pulse (!) 103   Temp 98  F (36.7  C) (Oral)   Ht 5' 6\" (1.676 m)   Wt (!) 278 lb (126.1 kg)   BMI 44.87 kg/m      Mental Status Examination:     Appearance   with adequate grooming overall.  He is a little intense today for example little bit more assertive than usually, but fully directable verbally.  No other behavioral stated evidence of david or depression or other affective dysregulation.  He is alert and oriented x3.  Attention and concentration are acceptable, though somewhat distracted.  Speech fluent.  Mood described as good.  Affect is restricted.  Thought processing concrete.  Associations tight.  Thought content no SI/HI/overt psychosis.  Language normal.  Short-term and long-term memory without gross abnormalities.  Fund of knowledge is fair.  Psychomotor activity normal.  Gait and station are stable.  Insight and judgment are limited.   Laboratory Data:     personally reviewed.   No visits with results within 2 Month(s) from this visit.   Latest known visit with results is:   Admission on 07/02/2019, Discharged on 07/08/2019   Component Date Value     VENTRICULAR RATE 07/05/2019 93      ATRIAL RATE 07/05/2019 93      P-R INTERVAL 07/05/2019 138      QRS DURATION 07/05/2019 84      Q-T INTERVAL 07/05/2019 366      QTC CALCULATION (BEZET) 07/05/2019 455      P Axis 07/05/2019 69      R AXIS 07/05/2019 64      T AXIS 07/05/2019 41      MUSE DIAGNOSIS 07/05/2019                      Value:Normal sinus rhythm  Normal ECG  When compared with ECG of 14-JUL-2016 16:29,  No significant " change was found  Confirmed by RAE VANCE, TIM LOC:JN ([id number]) on 7/8/2019 2:29:47 PM           Diagnosis:  Past Medical History:   Diagnosis Date     Bipolar I disorder (H)      Calcified granuloma of lung (H) 2014     Obesity (BMI 35.0-39.9 without comorbidity)      Paranoid schizophrenia (H)      Vitamin B 12 deficiency        Patient Active Problem List   Diagnosis     Mood disorder in conditions classified elsewhere     Left arm pain     Dandruff     Other dysfunctions of sleep stages or arousal from sleep     Weight gain     Back ache     Clinical depression     Chronic paranoid schizophrenia (H)     Neuroleptic consent form discussed and signed: July 17, 2016     Schizoaffective disorder (H)     Psychosis, unspecified psychosis type (H)     Visit diagnosis:   Schizoaffective disorder, chronic, severe, with recent exacerbation, currently in remission.    Nicotine dependence, unspecified type.    Tardive dyskinesia.    High risk medication use.    Encounter for family conference.    Morbid obesity.  Limited insight        Treatment Plan:  1. The patient was provided with education and detailed written and verbal instructions in native language on diagnosis, future follow-up, and treatment plan, same instructions were also provided in English language.   2. Instructed to return to clinic in  3 months or sooner if needed.  3. Nurse only clinic is available in the interim if needed.  4. Crisis plan in place.  5.   We will contact the patient's  to obtain commitment and ITP orders and to advise on referral to rep. payee  6.  Consider referral to neurology -  movement disorder clinic at the [facility] Dr. Crow for evaluation of dyskinesia if progression is noted     This note was created by undersigned using a Dragon dictation system. All typing errors or contextual distortion are unintentional and software inherent.     Sue Johnson MD

## 2021-06-04 VITALS
BODY MASS INDEX: 44.68 KG/M2 | HEIGHT: 66 IN | WEIGHT: 278 LBS | TEMPERATURE: 98 F | SYSTOLIC BLOOD PRESSURE: 122 MMHG | DIASTOLIC BLOOD PRESSURE: 81 MMHG | HEART RATE: 103 BPM

## 2021-06-04 VITALS
HEIGHT: 66 IN | HEART RATE: 95 BPM | BODY MASS INDEX: 40.82 KG/M2 | SYSTOLIC BLOOD PRESSURE: 117 MMHG | WEIGHT: 254 LBS | TEMPERATURE: 98.1 F | DIASTOLIC BLOOD PRESSURE: 78 MMHG

## 2021-06-04 NOTE — TELEPHONE ENCOUNTER
Mother called wondering if you can go up on his Prolixin dose.  She reports no drastic change in his behavior but is concerning that smoking can interact with his medication.   Per mother:  - pt smokes 1 PPD or more  - Pt got fired from job. Mother said he was trying to get some time off to go to [employer] and got fired.  - Dating online with multiple woman  - Sent another $ 200 to one of his online girlfriend.  - Now is talking about going to another state to meet with a new woman he meet online.    Advised to take him to the ED if any signs of worsening of psychosis or odd behavior.

## 2021-06-05 NOTE — TELEPHONE ENCOUNTER
Mother called asking us to renew Prolixin order. Next injection due is 1/14/20  Pended for provider to review and sign. Order needs to be printed and [facility] to [facility] @ [phone]

## 2021-06-07 NOTE — PROGRESS NOTES
Pt is here for culturally sensitive psychiatric med management follow up and accompanied by his mother. Pt reports stable mood.   His mother worries that he is smoking too much. Pt got his last injection on 4/7, next scheduled for 4/21    Correct pharmacy verified with patient and confirmed in snapshot? [x] yes []no    Charge captured ? [x] yes  [] no    Medications Phoned  to Pharmacy [] yes [x]no  Name of Pharmacist:  List Medications, including dose, quantity and instructions      Medication Prescriptions given to patient   [] yes  [x] no   List the name of the drug the prescription was written for.       Medications ordered this visit were e-scribed.  Verified by order class [] yes  [x] no  Prolixin order faxed to infusion center  Medication changes or discontinuations were communicated to patient's pharmacy: [] yes  [x] no    UA collected [] yes  [x] no    [place] Prescription Monitoring [place] Reviewed? [] yes  [x] no    Referrals were made to:  none  Future appointment was made: [x] yes  [] no  9/3/20  Dictation completed at time of chart check: [] yes  [x] no    I have checked the documentation for today s encounters and the above information has been reviewed and completed.    .

## 2021-06-07 NOTE — PROGRESS NOTES
OUTPATIENT PROGRESS NOTE      Date of visit April 9, 2020     There have not been many changes since last visit on 11/14/19  in chief complaint, reasons for visit, subjective, objective, nursing report, coordination of care, procedures, mental status examination, visit diagnosis, and treatment plan, so the note was partially copied from that date.         Mother Ilene Nichole      Reasons For Visit Are Multiple Today:     COVID19 precautions were employed during this visit due to COVID 19 pandemic    1.  Culturally sensitive follow-up for mental health issues  2.   Review of psychiatric symptoms  3.  Family conference: The patient comes accompanied by his mother who provides collateral information and is present for interview per patient's request  4.  Collateral information from his mother  5.  Review of safety issues: none at this time per mother's report  6. Review of symptoms: no voices visions paranoia or delusions. No mood symptoms. No side effects with medication   7.  Multiple questions: The patient and his mother have multiple questions as to smoking cessation.  All questions were answered - options of smoking cesation, Nicotine gum 2 mg q1-2 hours or patch 7-14 mg  8.  High risk medication use: The patient is on chronic prescription of Prolixin injections every 2 weeks - discussed that smoking may decrease Prolixin concentration  9.   Nigerien cultural issues: The interview is in the Nigerien language  10.   Smoking cessation: Again discussed her in detail.   11.  Renewal of prescriptions  12.  Education and coordination of future follow-up appointments  13.  High risk medication use: Discussed test was completed score 3.  Stable.  14.   Inventories: PHQ 9 score 0, SULLY 7 score 0. Discus score 2 - stable  15.  Discussion of healthy eating habits and lifestyle modifications.  16.  Discussion of coronavirus dynamics and precautions as per [place] state and the CDC recommendations- to patient's questions  and due to overall anxiety related to it in a Cambodian-speaking community    History of present illness/Subjective:  The patient is Cambodian speaking. The interview is conducted in Cambodian language, translated personally by me into English records which adds additional element of complexity to this visit and my assessment.  Today  the patient denies all psychiatric symptoms today including voices, visions, paranoia, delusional ideas, mood symptoms, side effects with medications problems with sleep.   No behavioral or stated evidence today of mood symptoms of psychosis, though patient continues to have a limited insight   Into his physical and mental health limitations at baseline.  The patient's mother still concerned   about his excessive smoking, and we discussed that that may decrease the concentration of Prolixin necessitating titration of the dose, and we discussed treatment for nicotine addiction as reflected above..    She says that otherwise he is doing well and has no other signs or symptoms. He is sober, no drugs no alcohol use.       Medications:      Current Outpatient Medications   Medication Sig Dispense Refill     fluPHENAZine decanoate (PROLIXIN) 25 mg/mL injection Inject 1 mL (25 mg total) into the shoulder, thigh, or buttocks every 14 (fourteen) days. 1 mL 12     latanoprost (XALATAN) 0.005 % ophthalmic solution Administer 1 drop to both eyes at bedtime.       No current facility-administered medications for this visit.          Family history/Social history   the patient lives with his parents.  He is unemployed at this time, on SSDI receiving $1000 per month.  He is not a drug with alcohol user.         Procedures: Complex visit as multiple issues were addressed, total of 16.  Total time today is 40 minutes, face to face and direct hands on patient's care in the clinic, more than 50% of time was coordination of care including extensive collateral information from the patient's mother,  family  "conference with the patient  Present,  medications and medication orders, coordination of follow-up   appointment.  1. Coordination of care: nursing notes, vital signs,  communication with the pharmacy, and  medication orders were reviewed signed and discussed with the patient. Multiple chart entries were reviewed in preparation of documentation and generation of documentation.  2.  Education: was provided on diagnosis, medication regimen, psychotherapeutic treatment modalities, future follow-up appointments.  3.  Counseling: was provided on coping with mental illness, safety issues, professional rehabilitation.  4.  Coordination of care: I personally coordinated all medication orders, follow up orders, pharmacy requests, and provided the patient with detailed instructions in his native language  5. Citizen of [place] cultural and immigration issues were addressed, the interview was conducted in Citizen of [place] language   6. Collateral information was obtained from mother  7. Family conference was held to discuss safety issues and referrals and medication regimen, smoking cessation, lifestyle modification, weight control, and the importance of compliance.       Review Of Systems:  As above.   He denies any particular side effects with medications .  The reminder of 10 systems was negative.      Vital Signs:    /78 (Patient Site: Left Arm, Patient Position: Sitting, Cuff Size: Adult Large)   Pulse 95   Temp 98.1  F (36.7  C) (Oral)   Ht 5' 6\" (1.676 m)   Wt (!) 254 lb (115.2 kg)   BMI 41.00 kg/m      Mental Status Examination:     Appearance   with adequate grooming overall.  He is a little intense today for example little bit more assertive than usually, but fully directable verbally.  No other behavioral stated evidence of david or depression or other affective dysregulation.  He is alert and oriented x3.  Attention and concentration are good.  Speech fluent.  Mood described as good.  Affect is restricted.  Thought " processing concrete.  Associations tight.  Thought content no SI/HI/ and no overt psychosis.  Language normal.  Short-term and long-term memory without gross abnormalities.  Fund of knowledge is fair.  Psychomotor activity normal.  Gait and station are stable.  Insight and judgment are limited.   Laboratory Data:     personally reviewed.   No visits with results within 2 Month(s) from this visit.   Latest known visit with results is:   Admission on 07/02/2019, Discharged on 07/08/2019   Component Date Value     VENTRICULAR RATE 07/05/2019 93      ATRIAL RATE 07/05/2019 93      P-R INTERVAL 07/05/2019 138      QRS DURATION 07/05/2019 84      Q-T INTERVAL 07/05/2019 366      QTC CALCULATION (BEZET) 07/05/2019 455      P Axis 07/05/2019 69      R AXIS 07/05/2019 64      T AXIS 07/05/2019 41      MUSE DIAGNOSIS 07/05/2019                      Value:Normal sinus rhythm  Normal ECG  When compared with ECG of 14-JUL-2016 16:29,  No significant change was found  Confirmed by RAE VANCE, LES LOC:YORDAN ([id number]) on 7/8/2019 2:29:47 PM           Diagnosis:  Past Medical History:   Diagnosis Date     Bipolar I disorder (H)      Calcified granuloma of lung (H) 2014     Obesity (BMI 35.0-39.9 without comorbidity)      Paranoid schizophrenia (H)      Vitamin B 12 deficiency        Patient Active Problem List   Diagnosis     Mood disorder in conditions classified elsewhere     Left arm pain     Dandruff     Other dysfunctions of sleep stages or arousal from sleep     Weight gain     Back ache     Clinical depression     Chronic paranoid schizophrenia (H)     Neuroleptic consent form discussed and signed: July 17, 2016     Schizoaffective disorder (H)     Psychosis, unspecified psychosis type (H)     Visit diagnosis:   Schizoaffective disorder, chronic, severe, with recent exacerbation, currently in remission.    Nicotine dependence, unspecified type.    High risk medication use.    Encounter for family conference.    Morbid obesity            Treatment Plan:  1. The patient was provided with education and detailed written and verbal instructions in native language on diagnosis, future follow-up, and treatment plan, same instructions were also provided in English language.   2. Instructed to return to clinic in  3 months or sooner if needed.  3. Nurse only clinic is available in the interim if needed.  4. Crisis plan in place.  5.  Consider referral to neurology -  movement disorder clinic at the [facility] Dr. Crow for evaluation of dyskinesia if progression is noted - stable now with some improvent             This note was created by undersigned using a Dragon dictation system. All typing errors or contextual distortion are unintentional and software inherent.     Sue Johnson MD

## 2021-06-08 NOTE — PROGRESS NOTES
Mental Health Processing Group    Date of Service: 01/11/2017 Start Time: 2:00 Stop Time: 3:00 Total Time: 60 minutes  Frequency: Wednesdays    CPT: 73207    Care Provider: Petty Storm MA, [facility]  N= 3    [employer] interpretor was used for this session.     Subjective: The patient was seen today for a 60 minute group psychotherapy session held at [facility].     Mental Status Exam:    Patient was pleasant and cooperative. The patient was oriented X4. The patient made good eye contact and was well dressed with good grooming and hygiene. Recent and remote memories were intact. Concentration and focus was normal. Speech (tone, volume, and rate) were intact. Mood and affect were congruently anxious but appropriate for the content of the session. Fund of knowledge was normal. Thought process was mostly logical and linear. Insight and judgment were intact. Patient denied SI, plan and/or means. Pt continues to be motivated for treatment.     Objective:    Patient was able to participate and benefit from treatment as evidenced by the patient s verbal expression and understanding of idea discussed.     New Symptoms or Complaints: The patient did not disclose any new symptoms to the undersigned.     Reason Patient is participating in the group: The group session was necessary for the care of the patient to address how symptoms impact the patient s mental health, relationships and overall well-being.      Patient's response to current intervention: Patient was receptive of feedback given, supportive of other group members and appears to have benefited from the group process. No barriers to learning evidenced.      Progress toward short-term goals: The patient completed the assigned homework which was to assess maladaptive thinking and its connections to the patient s pervious and/or current behaviors.     Patient's Impressions: The patient indicated readiness to learn by the patient s choice to attend  group.     Review of long term goals: See treatment plan developed by outpatient counselor.    Are there any new goals: There were no new goals brought to the undersigned s attention.      The patient completed today s group session for the processing of Mental Health Symptoms and its relationship to the patient s Chemical Dependency. Group content and interventions performed in this session: Check-in, the connection between thoughts, feelings, and behaviors. Group members also handling conflicts and understanding mental health.     Patient education on the above topics was provided during this session. The patient was given an opportunity to ask questions and participate in the group discussion. The patient exhibited individual understanding by asking relevant questions and making appropriate comments to other group members.    Patient indicated upon onset of group that his anxiety was a 2 out of 10. Patient reported he felt okay.     Assessment:  1. Paranoid schizophrenia, chronic condition with acute exacerbation      No indication of SI, plan and/or means.     Providers Impression of Current Status:   Patient participated actively in today's group therapy session and appears to have learned the impact of one s thinking on feelings and behaviors. Patient will continue to benefit from ongoing psychotherapy that uses CBT and MI.     Psychotherapeutic Techniques: A cognitive behavioral modality was used.     PLAN:    Follow-up: Continue to attend groups to address mental health symptom(s)     Discharge Criteria Planning: Alleviation of mental health symptoms.     Encounter performed and documented by Petty Storm MA, [facility]

## 2021-06-08 NOTE — PROGRESS NOTES
Mental Health Processing Group    Date of Service: 02/8/2017 Start Time: 2:00 Stop Time: 3:00 Total Time: 60 minutes  Frequency: Wednesdays    CPT: [id number]    Care Provider: Petty Storm MA, [facility]  N= 5    [employer] interpretor was used for this scheduled session.     Subjective: The patient was seen today for a 60 minute group psychotherapy session held at [facility].     Mental Status Exam:    Patient was pleasant and cooperative. The patient was oriented X4. The patient made good eye contact and was well dressed with good grooming and hygiene. Recent and remote memories were intact. Concentration and focus was normal. Speech (tone, volume, and rate) were intact. Mood and affect were congruently anxious but appropriate for the content of the session. Fund of knowledge was normal. Thought process was mostly logical and linear. Insight and judgment were intact. Patient denied SI, plan and/or means. Pt continues to be motivated for treatment.     Objective:    Patient was able to participate and benefit from treatment as evidenced by the patient s verbal expression and understanding of idea discussed.     New Symptoms or Complaints: The patient did not disclose any new symptoms to the undersigned.     Reason Patient is participating in the group: The group session was necessary for the care of the patient to address how symptoms impact the patient s mental health, relationships and overall well-being.      Patient's response to current intervention: Patient was receptive of feedback given, supportive of other group members and appears to have benefited from the group process. No barriers to learning evidenced.      Progress toward short-term goals: The patient completed the assigned homework which was to assess maladaptive thinking and its connections to the patient s pervious and/or current behaviors.     Patient's Impressions: The patient indicated readiness to learn by the patient s choice to  attend group.     Review of long term goals: See treatment plan developed by outpatient counselor.    Are there any new goals: There were no new goals brought to the undersigned s attention.      The patient completed today s group session for the processing of Mental Health Symptoms and its relationship to the patient s Chemical Dependency. Group content and interventions performed in this session: Check-in, the connection between thoughts, feelings, and behaviors. Group members also talked about handling external struggles and conflicts.     Patient education on the above topics was provided during this session. The patient was given an opportunity to ask questions and participate in the group discussion. The patient exhibited individual understanding by asking relevant questions and making appropriate comments to other group members.    Patient indicated upon onset of group that his anxiety was a 2 out of 10. Patient reported he felt okay.     Assessment:  1. Paranoid schizophrenia, chronic condition with acute exacerbation      No indication of SI, plan and/or means.     Providers Impression of Current Status:   Patient participated actively in today's group therapy session and appears to have learned the impact of one s thinking on feelings and behaviors. Patient will continue to benefit from ongoing psychotherapy that uses CBT and MI.     Psychotherapeutic Techniques: A cognitive behavioral modality was used.     PLAN:    Follow-up: Continue to attend groups to address mental health symptom(s)     Discharge Criteria Planning: Alleviation of mental health symptoms.     Encounter performed and documented by Petty Storm MA, [facility]

## 2021-06-08 NOTE — PROGRESS NOTES
Mental Health Processing Group    Date of Service: 01/04/2017 Start Time: 2:00 Stop Time: 3:00 Total Time: 60 minutes  Frequency: Wednesdays    CPT: 67709    Care Provider: Petty Storm MA, [facility]  N= 6    [employer] interpretor was used for this session.     Subjective: The patient was seen today for a 60 minute group psychotherapy session held at [facility].     Mental Status Exam:    Patient was pleasant and cooperative. The patient was oriented X4. The patient made good eye contact and was well dressed with good grooming and hygiene. Recent and remote memories were intact. Concentration and focus was normal. Speech (tone, volume, and rate) were intact. Mood and affect were congruently anxious but appropriate for the content of the session. Fund of knowledge was normal. Thought process was mostly logical and linear. Insight and judgment were intact. Patient denied SI, plan and/or means. Pt continues to be motivated for treatment.     Objective:    Patient was able to participate and benefit from treatment as evidenced by the patient s verbal expression and understanding of idea discussed.     New Symptoms or Complaints: The patient did not disclose any new symptoms to the undersigned.     Reason Patient is participating in the group: The group session was necessary for the care of the patient to address how symptoms impact the patient s mental health, relationships and overall well-being.      Patient's response to current intervention: Patient was receptive of feedback given, supportive of other group members and appears to have benefited from the group process. No barriers to learning evidenced.      Progress toward short-term goals: The patient completed the assigned homework which was to assess maladaptive thinking and its connections to the patient s pervious and/or current behaviors.     Patient's Impressions: The patient indicated readiness to learn by the patient s choice to attend  group.     Review of long term goals: See treatment plan developed by outpatient counselor.    Are there any new goals: There were no new goals brought to the undersigned s attention.      The patient completed today s group session for the processing of Mental Health Symptoms and its relationship to the patient s Chemical Dependency. Group content and interventions performed in this session: Check-in, the connection between thoughts, feelings, and behaviors. Group members also talked staying in the moment and setting realistic goals.     Patient education on the above topics was provided during this session. The patient was given an opportunity to ask questions and participate in the group discussion. The patient exhibited individual understanding by asking relevant questions and making appropriate comments to other group members.    Patient indicated upon onset of group that his anxiety was a 2 out of 10. Patient reported he felt okay.     Assessment:  1. Paranoid schizophrenia, chronic condition with acute exacerbation      No indication of SI, plan and/or means.     Providers Impression of Current Status:   Patient participated actively in today's group therapy session and appears to have learned the impact of one s thinking on feelings and behaviors. Patient will continue to benefit from ongoing psychotherapy that uses CBT and MI.     Psychotherapeutic Techniques: A cognitive behavioral modality was used.     PLAN:    Follow-up: Continue to attend groups to address mental health symptom(s)     Discharge Criteria Planning: Alleviation of mental health symptoms.     Encounter performed and documented by Petty Storm MA, [facility]

## 2021-06-08 NOTE — PROGRESS NOTES
Mental Health Processing Group    Date of Service: 02/1/2017 Start Time: 2:00 Stop Time: 3:00 Total Time: 60 minutes  Frequency: Wednesdays    CPT: [id number]    Care Provider: Petty Storm MA, [facility]  N= 8    [employer] interpretor was used for this session.     Subjective: The patient was seen today for a 60 minute group psychotherapy session held at [facility].     Mental Status Exam:    Patient was pleasant and cooperative. The patient was oriented X4. The patient made good eye contact and was well dressed with good grooming and hygiene. Recent and remote memories were intact. Concentration and focus was normal. Speech (tone, volume, and rate) were intact. Mood and affect were congruently anxious but appropriate for the content of the session. Fund of knowledge was normal. Thought process was mostly logical and linear. Insight and judgment were intact. Patient denied SI, plan and/or means. Pt continues to be motivated for treatment.     Objective:    Patient was able to participate and benefit from treatment as evidenced by the patient s verbal expression and understanding of idea discussed.     New Symptoms or Complaints: The patient did not disclose any new symptoms to the undersigned.     Reason Patient is participating in the group: The group session was necessary for the care of the patient to address how symptoms impact the patient s mental health, relationships and overall well-being.      Patient's response to current intervention: Patient was receptive of feedback given, supportive of other group members and appears to have benefited from the group process. No barriers to learning evidenced.      Progress toward short-term goals: The patient completed the assigned homework which was to assess maladaptive thinking and its connections to the patient s pervious and/or current behaviors.     Patient's Impressions: The patient indicated readiness to learn by the patient s choice to attend  group.     Review of long term goals: See treatment plan developed by outpatient counselor.    Are there any new goals: There were no new goals brought to the undersigned s attention.      The patient completed today s group session for the processing of Mental Health Symptoms and its relationship to the patient s Chemical Dependency. Group content and interventions performed in this session: Check-in, the connection between thoughts, feelings, and behaviors. Group members also talked about focusing on the hear and now and ways to overcome difficult challenges.     Patient education on the above topics was provided during this session. The patient was given an opportunity to ask questions and participate in the group discussion. The patient exhibited individual understanding by asking relevant questions and making appropriate comments to other group members.    Patient indicated upon onset of group that his anxiety was a 3 out of 10. Patient reported he felt fine.     Assessment:  1. Paranoid schizophrenia, chronic condition with acute exacerbation      No indication of SI, plan and/or means.     Providers Impression of Current Status:   Patient participated actively in today's group therapy session and appears to have learned the impact of one s thinking on feelings and behaviors. Patient will continue to benefit from ongoing psychotherapy that uses CBT and MI.     Psychotherapeutic Techniques: A cognitive behavioral modality was used.     PLAN:    Follow-up: Continue to attend groups to address mental health symptom(s)     Discharge Criteria Planning: Alleviation of mental health symptoms.     Encounter performed and documented by Petty Storm MA, [facility]

## 2021-06-08 NOTE — PROGRESS NOTES
Mental Health Processing Group    Date of Service: 01/18/2017 Start Time: 2:00 Stop Time: 3:00 Total Time: 60 minutes  Frequency: Wednesdays    CPT: [id number]    Care Provider: Petty Storm MA, [facility]  N= 7    [employer] interpretor was used for this session.     Subjective: The patient was seen today for a 60 minute group psychotherapy session held at [facility].     Mental Status Exam:    Patient was pleasant and cooperative. The patient was oriented X4. The patient made good eye contact and was well dressed with good grooming and hygiene. Recent and remote memories were intact. Concentration and focus was normal. Speech (tone, volume, and rate) were intact. Mood and affect were congruently anxious but appropriate for the content of the session. Fund of knowledge was normal. Thought process was mostly logical and linear. Insight and judgment were intact. Patient denied SI, plan and/or means. Pt continues to be motivated for treatment.     Objective:    Patient was able to participate and benefit from treatment as evidenced by the patient s verbal expression and understanding of idea discussed.     New Symptoms or Complaints: The patient did not disclose any new symptoms to the undersigned.     Reason Patient is participating in the group: The group session was necessary for the care of the patient to address how symptoms impact the patient s mental health, relationships and overall well-being.      Patient's response to current intervention: Patient was receptive of feedback given, supportive of other group members and appears to have benefited from the group process. No barriers to learning evidenced.      Progress toward short-term goals: The patient completed the assigned homework which was to assess maladaptive thinking and its connections to the patient s pervious and/or current behaviors.     Patient's Impressions: The patient indicated readiness to learn by the patient s choice to attend  group.     Review of long term goals: See treatment plan developed by outpatient counselor.    Are there any new goals: There were no new goals brought to the undersigned s attention.      The patient completed today s group session for the processing of Mental Health Symptoms and its relationship to the patient s Chemical Dependency. Group content and interventions performed in this session: Check-in, the connection between thoughts, feelings, and behaviors. Group members also talked about finding passion in life.     Patient education on the above topics was provided during this session. The patient was given an opportunity to ask questions and participate in the group discussion. The patient exhibited individual understanding by asking relevant questions and making appropriate comments to other group members.    Patient indicated upon onset of group that his anxiety was a 2 out of 10. Patient reported he felt okay.     Assessment:  1. Paranoid schizophrenia, chronic condition with acute exacerbation      No indication of SI, plan and/or means.     Providers Impression of Current Status:   Patient participated actively in today's group therapy session and appears to have learned the impact of one s thinking on feelings and behaviors. Patient will continue to benefit from ongoing psychotherapy that uses CBT and MI.     Psychotherapeutic Techniques: A cognitive behavioral modality was used.     PLAN:    Follow-up: Continue to attend groups to address mental health symptom(s)     Discharge Criteria Planning: Alleviation of mental health symptoms.     Encounter performed and documented by Petty Storm MA, [facility]

## 2021-06-09 NOTE — TELEPHONE ENCOUNTER
Mother Cheryl contacted the clinic asking to renew Prolixin injection Rx. Order has to be printed, signed and [facility]ed to [facility] @ [phone]  Last injection: 7/14/20  Next due: 7/28/2020

## 2021-06-09 NOTE — PROGRESS NOTES
Mental Health Processing Group    Date of Service: 03/8/2017 Start Time: 2:00 Stop Time: 3:00 Total Time: 60 minutes  Frequency: Wednesdays    CPT: [id number]    Care Provider: Petty Storm MA, [facility]  N= 4    Subjective: The patient was seen today for a 60 minute group psychotherapy session held at [facility].     Mental Status Exam:    Patient was pleasant and cooperative. The patient was oriented X4. The patient made good eye contact and was well dressed with good grooming and hygiene. Recent and remote memories were intact. Concentration and focus was normal. Speech (tone, volume, and rate) were intact. Mood and affect were congruently anxious but appropriate for the content of the session. Fund of knowledge was normal. Thought process was mostly logical and linear. Insight and judgment were intact. Patient denied SI, plan and/or means. Pt continues to be motivated for treatment.     Objective:    Patient was able to participate and benefit from treatment as evidenced by the patient s verbal expression and understanding of idea discussed.     New Symptoms or Complaints: The patient did not disclose any new symptoms to the undersigned.     Reason Patient is participating in the group: The group session was necessary for the care of the patient to address how symptoms impact the patient s mental health, relationships and overall well-being.      Patient's response to current intervention: Patient was receptive of feedback given, supportive of other group members and appears to have benefited from the group process. No barriers to learning evidenced.      Progress toward short-term goals: The patient completed the assigned homework which was to assess maladaptive thinking and its connections to the patient s pervious and/or current behaviors.     Patient's Impressions: The patient indicated readiness to learn by the patient s choice to attend group.     Review of long term goals: See treatment plan  developed by outpatient counselor.    Are there any new goals: There were no new goals brought to the undersigned s attention.      The patient completed today s group session for the processing of Mental Health Symptoms and its relationship to the patient s Chemical Dependency. Group content and interventions performed in this session: Check-in, the connection between thoughts, feelings, and behaviors. Group members also talked about sleep hygiene and understanding mental health symptoms.     Patient education on the above topics was provided during this session. The patient was given an opportunity to ask questions and participate in the group discussion. The patient exhibited individual understanding by asking relevant questions and making appropriate comments to other group members.    Patient indicated upon onset of group that his anxiety was a 3 out of 10. Patient reported he felt good.    Assessment:  1. Paranoid schizophrenia, chronic condition with acute exacerbation      No indication of SI, plan and/or means.     Providers Impression of Current Status:   Patient participated actively in today's group therapy session and appears to have learned the impact of one s thinking on feelings and behaviors. Patient will continue to benefit from ongoing psychotherapy that uses CBT and MI.     Psychotherapeutic Techniques: A cognitive behavioral modality was used.     PLAN:    Follow-up: Continue to attend groups to address mental health symptom(s)     Discharge Criteria Planning: Alleviation of mental health symptoms.     Encounter performed and documented by Petty Storm MA, [facility]

## 2021-06-09 NOTE — PROGRESS NOTES
Mental Health Processing Group     Date of Service: 03/1/2017 Start Time: 2:00 Stop Time: 3:00 Total Time: 60 minutes  Frequency: Wednesdays    CPT: 80914    Care Provider: Petty Storm MA, [facility]  N= 4    [email or web address] interpretor was used for this session.      Subjective: The patient was seen today for a 60 minute group psychotherapy session held at [facility].      Mental Status Exam:    Patient was pleasant and cooperative. The patient was oriented X4. The patient made good eye contact and was well dressed with good grooming and hygiene. Recent and remote memories were intact. Concentration and focus was normal. Speech (tone, volume, and rate) were intact. Mood and affect were congruently anxious but appropriate for the content of the session. Fund of knowledge was normal. Thought process was mostly logical and linear. Insight and judgment were intact. Patient denied SI, plan and/or means. Pt continues to be motivated for treatment.      Objective:    Patient was able to participate and benefit from treatment as evidenced by the patient s verbal expression and understanding of idea discussed.      New Symptoms or Complaints: The patient did not disclose any new symptoms to the undersigned.      Reason Patient is participating in the group: The group session was necessary for the care of the patient to address how symptoms impact the patient s mental health, relationships and overall well-being.       Patient's response to current intervention: Patient was receptive of feedback given, supportive of other group members and appears to have benefited from the group process. No barriers to learning evidenced.       Progress toward short-term goals: The patient completed the assigned homework which was to assess maladaptive thinking and its connections to the patient s pervious and/or current behaviors.      Patient's Impressions: The patient indicated readiness to learn by the patient s choice to  attend group.      Review of long term goals: See treatment plan developed by outpatient counselor.     Are there any new goals: There were no new goals brought to the undersigned s attention.       The patient completed today s group session for the processing of Mental Health Symptoms and its relationship to the patient s Chemical Dependency. Group content and interventions performed in this session: Check-in, the connection between thoughts, feelings, and behaviors. Group members also talked about finding reta in life and identifying stressor in life.     Patient education on the above topics was provided during this session. The patient was given an opportunity to ask questions and participate in the group discussion. The patient exhibited individual understanding by asking relevant questions and making appropriate comments to other group members.     Patient indicated upon onset of group that his anxiety was a 3 out of 10. Patient reported he felt good.     Assessment:     1. Paranoid schizophrenia, chronic condition with acute exacerbation      No indication of SI, plan and/or means.      Providers Impression of Current Status:   Patient participated actively in today's group therapy session and appears to have learned the impact of one s thinking on feelings and behaviors. Patient will continue to benefit from ongoing psychotherapy that uses CBT and MI.      Psychotherapeutic Techniques: A cognitive behavioral modality was used.      PLAN:    Follow-up: Continue to attend groups to address mental health symptom(s)      Discharge Criteria Planning: Alleviation of mental health symptoms.      Encounter performed and documented by Petty Storm MA, [facility]

## 2021-06-09 NOTE — PROGRESS NOTES
Mental Health Processing Group    Date of Service: 02/15/2017 Start Time: 2:00 Stop Time: 3:00 Total Time: 60 minutes  Frequency: Wednesdays    CPT: 29611    Care Provider: Petty Storm MA, [facility]  N= 7    [employer] interpretor was used for this session.     Subjective: The patient was seen today for a 60 minute group psychotherapy session held at [facility].     Mental Status Exam:    Patient was pleasant and cooperative. The patient was oriented X4. The patient made good eye contact and was well dressed with good grooming and hygiene. Recent and remote memories were intact. Concentration and focus was normal. Speech (tone, volume, and rate) were intact. Mood and affect were congruently anxious but appropriate for the content of the session. Fund of knowledge was normal. Thought process was mostly logical and linear. Insight and judgment were intact. Patient denied SI, plan and/or means. Pt continues to be motivated for treatment.     Objective:    Patient was able to participate and benefit from treatment as evidenced by the patient s verbal expression and understanding of idea discussed.     New Symptoms or Complaints: The patient did not disclose any new symptoms to the undersigned.     Reason Patient is participating in the group: The group session was necessary for the care of the patient to address how symptoms impact the patient s mental health, relationships and overall well-being.      Patient's response to current intervention: Patient was receptive of feedback given, supportive of other group members and appears to have benefited from the group process. No barriers to learning evidenced.      Progress toward short-term goals: The patient completed the assigned homework which was to assess maladaptive thinking and its connections to the patient s pervious and/or current behaviors.     Patient's Impressions: The patient indicated readiness to learn by the patient s choice to attend  group.     Review of long term goals: See treatment plan developed by outpatient counselor.    Are there any new goals: There were no new goals brought to the undersigned s attention.      The patient completed today s group session for the processing of Mental Health Symptoms and its relationship to the patient s Chemical Dependency. Group content and interventions performed in this session: Check-in, the connection between thoughts, feelings, and behaviors. Group members also talked about frustrations and anger.     Patient education on the above topics was provided during this session. The patient was given an opportunity to ask questions and participate in the group discussion. The patient exhibited individual understanding by asking relevant questions and making appropriate comments to other group members.    Patient indicated upon onset of group that his anxiety was a 2 out of 10. Patient reported he felt great.     Assessment:  1. Paranoid schizophrenia, chronic condition with acute exacerbation      No indication of SI, plan and/or means.     Providers Impression of Current Status:   Patient participated actively in today's group therapy session and appears to have learned the impact of one s thinking on feelings and behaviors. Patient will continue to benefit from ongoing psychotherapy that uses CBT and MI.     Psychotherapeutic Techniques: A cognitive behavioral modality was used.     PLAN:    Follow-up: Continue to attend groups to address mental health symptom(s)     Discharge Criteria Planning: Alleviation of mental health symptoms.     Encounter performed and documented by Petty Storm MA, [facility]

## 2021-06-09 NOTE — TELEPHONE ENCOUNTER
Fang is calling in for fluPHENAZine decanoate (PROLIXIN) 25 mg/mL injection and advising pt has no more. She advised she can be reached at [phone]

## 2021-06-09 NOTE — PROGRESS NOTES
Mental Health Processing Group    Date of Service: 03/22/2017 Start Time: 2:00 Stop Time: 3:00 Total Time: 60 minutes  Frequency: Wednesdays    CPT: [id number]    Care Provider: Petty Storm MA, [facility]  N= 5    [facility] interpretor used for the session.     Subjective: The patient was seen today for a 60 minute group psychotherapy session held at [facility].     Mental Status Exam:    Patient was pleasant and cooperative. The patient was oriented X4. The patient made good eye contact and was well dressed with good grooming and hygiene. Recent and remote memories were intact. Concentration and focus was normal. Speech (tone, volume, and rate) were intact. Mood and affect were congruently anxious but appropriate for the content of the session. Fund of knowledge was normal. Thought process was mostly logical and linear. Insight and judgment were intact. Patient denied SI, plan and/or means. Pt continues to be motivated for treatment.     Objective:    Patient was able to participate and benefit from treatment as evidenced by the patient s verbal expression and understanding of idea discussed.     New Symptoms or Complaints: The patient did not disclose any new symptoms to the undersigned.     Reason Patient is participating in the group: The group session was necessary for the care of the patient to address how symptoms impact the patient s mental health, relationships and overall well-being.      Patient's response to current intervention: Patient was receptive of feedback given, supportive of other group members and appears to have benefited from the group process. No barriers to learning evidenced.      Progress toward short-term goals: The patient completed the assigned homework which was to assess maladaptive thinking and its connections to the patient s pervious and/or current behaviors.     Patient's Impressions: The patient indicated readiness to learn by the patient s choice to attend group.      Review of long term goals: See treatment plan developed by outpatient counselor.    Are there any new goals: There were no new goals brought to the undersigned s attention.      The patient completed today s group session for the processing of Mental Health Symptoms and its relationship to the patient s Chemical Dependency. Group content and interventions performed in this session: Check-in, the connection between thoughts, feelings, and behaviors. Group members also talked about conflict resolution.     Patient education on the above topics was provided during this session. The patient was given an opportunity to ask questions and participate in the group discussion. The patient exhibited individual understanding by asking relevant questions and making appropriate comments to other group members.    Patient indicated upon onset of group that his anxiety was a 2 out of 10. Patient reported he felt good.    Assessment:  1. Paranoid schizophrenia, chronic condition with acute exacerbation      No indication of SI, plan and/or means.     Providers Impression of Current Status:   Patient participated actively in today's group therapy session and appears to have learned the impact of one s thinking on feelings and behaviors. Patient will continue to benefit from ongoing psychotherapy that uses CBT and MI.     Psychotherapeutic Techniques: A cognitive behavioral modality was used.     PLAN:    Follow-up: Continue to attend groups to address mental health symptom(s)     Discharge Criteria Planning: Alleviation of mental health symptoms.     Encounter performed and documented by Petty Storm MA, [facility]

## 2021-06-09 NOTE — TELEPHONE ENCOUNTER
Order class changed to No Print. Dr. Johnson, please sign if appropriate, I will work with [facility], hopefully they will take it with e-signature.

## 2021-06-09 NOTE — PROGRESS NOTES
Mental Health Processing Group    Date of Service: 02/22/2017 Start Time: 2:00 Stop Time: 3:00 Total Time: 60 minutes  Frequency: Wednesdays    CPT: 36725    Care Provider: Petty Storm MA, [facility]  N= 6    [email or web address] interpretor was used for this session.     Subjective: The patient was seen today for a 60 minute group psychotherapy session held at [facility].     Mental Status Exam:    Patient was pleasant and cooperative. The patient was oriented X4. The patient made good eye contact and was well dressed with good grooming and hygiene. Recent and remote memories were intact. Concentration and focus was normal. Speech (tone, volume, and rate) were intact. Mood and affect were congruently anxious but appropriate for the content of the session. Fund of knowledge was normal. Thought process was mostly logical and linear. Insight and judgment were intact. Patient denied SI, plan and/or means. Pt continues to be motivated for treatment.     Objective:    Patient was able to participate and benefit from treatment as evidenced by the patient s verbal expression and understanding of idea discussed.     New Symptoms or Complaints: The patient did not disclose any new symptoms to the undersigned.     Reason Patient is participating in the group: The group session was necessary for the care of the patient to address how symptoms impact the patient s mental health, relationships and overall well-being.      Patient's response to current intervention: Patient was receptive of feedback given, supportive of other group members and appears to have benefited from the group process. No barriers to learning evidenced.      Progress toward short-term goals: The patient completed the assigned homework which was to assess maladaptive thinking and its connections to the patient s pervious and/or current behaviors.     Patient's Impressions: The patient indicated readiness to learn by the patient s choice to attend  group.     Review of long term goals: See treatment plan developed by outpatient counselor.    Are there any new goals: There were no new goals brought to the undersigned s attention.      The patient completed today s group session for the processing of Mental Health Symptoms and its relationship to the patient s Chemical Dependency. Group content and interventions performed in this session: Check-in, the connection between thoughts, feelings, and behaviors. Group members also talked about handling nightmares and processing challenging situations.     Patient education on the above topics was provided during this session. The patient was given an opportunity to ask questions and participate in the group discussion. The patient exhibited individual understanding by asking relevant questions and making appropriate comments to other group members.    Patient indicated upon onset of group that his anxiety was a 2 out of 10. Patient reported he felt good.     Assessment:  1. Paranoid schizophrenia, chronic condition with acute exacerbation      No indication of SI, plan and/or means.     Providers Impression of Current Status:   Patient participated actively in today's group therapy session and appears to have learned the impact of one s thinking on feelings and behaviors. Patient will continue to benefit from ongoing psychotherapy that uses CBT and MI.     Psychotherapeutic Techniques: A cognitive behavioral modality was used.     PLAN:    Follow-up: Continue to attend groups to address mental health symptom(s)     Discharge Criteria Planning: Alleviation of mental health symptoms.     Encounter performed and documented by Petty Storm MA, [facility]

## 2021-06-09 NOTE — PROGRESS NOTES
Mental Health Processing Group    Date of Service: 03/29/2017 Start Time: 2:00 Stop Time: 3:00 Total Time: 60 minutes  Frequency: Wednesdays    CPT: 60706    Care Provider: Petty Storm MA, [facility]  N= 6    Subjective: The patient was seen today for a 60 minute group psychotherapy session held at [facility].     Mental Status Exam:    Patient was pleasant and cooperative. The patient was oriented X4. The patient made good eye contact and was well dressed with good grooming and hygiene. Recent and remote memories were intact. Concentration and focus was normal. Speech (tone, volume, and rate) were intact. Mood and affect were congruently anxious but appropriate for the content of the session. Fund of knowledge was normal. Thought process was mostly logical and linear. Insight and judgment were intact. Patient denied SI, plan and/or means. Pt continues to be motivated for treatment.     Objective:    Patient was able to participate and benefit from treatment as evidenced by the patient s verbal expression and understanding of idea discussed.     New Symptoms or Complaints: The patient did not disclose any new symptoms to the undersigned.     Reason Patient is participating in the group: The group session was necessary for the care of the patient to address how symptoms impact the patient s mental health, relationships and overall well-being.      Patient's response to current intervention: Patient was receptive of feedback given, supportive of other group members and appears to have benefited from the group process. No barriers to learning evidenced.      Progress toward short-term goals: The patient completed the assigned homework which was to assess maladaptive thinking and its connections to the patient s pervious and/or current behaviors.     Patient's Impressions: The patient indicated readiness to learn by the patient s choice to attend group.     Review of long term goals: See treatment plan  developed by outpatient counselor.    Are there any new goals: There were no new goals brought to the undersigned s attention.      The patient completed today s group session for the processing of Mental Health Symptoms and its relationship to the patient s Chemical Dependency. Group content and interventions performed in this session: Check-in, the connection between thoughts, feelings, and behaviors. Group members also talked about understanding anger and handling difficult situations.     Patient education on the above topics was provided during this session. The patient was given an opportunity to ask questions and participate in the group discussion. The patient exhibited individual understanding by asking relevant questions and making appropriate comments to other group members.    Patient indicated upon onset of group that his anxiety was a 2 out of 10. Patient reported he felt good.    Assessment:  1. Paranoid schizophrenia, chronic condition with acute exacerbation      No indication of SI, plan and/or means.     Providers Impression of Current Status:   Patient participated actively in today's group therapy session and appears to have learned the impact of one s thinking on feelings and behaviors. Patient will continue to benefit from ongoing psychotherapy that uses CBT and MI.     Psychotherapeutic Techniques: A cognitive behavioral modality was used.     PLAN:    Follow-up: Continue to attend groups to address mental health symptom(s)     Discharge Criteria Planning: Alleviation of mental health symptoms.     Encounter performed and documented by Petty Storm MA, [facility]

## 2021-06-10 NOTE — PROGRESS NOTES
Mental Health Processing Group    Date of Service: 04/19/2017 Start Time: 2:00 Stop Time: 3:00 Total Time: 60 minutes  Frequency: Wednesdays    CPT: [id number]    Care Provider: Petty Storm MA, [facility]  N= 3    [employer] interpretor was used for the session    Subjective: The patient was seen today for a 60 minute group psychotherapy session held at [facility].     Mental Status Exam:    Patient was pleasant and cooperative. The patient was oriented X4. The patient made good eye contact and was well dressed with good grooming and hygiene. Recent and remote memories were intact. Concentration and focus was normal. Speech (tone, volume, and rate) were intact. Mood and affect were congruently anxious but appropriate for the content of the session. Fund of knowledge was normal. Thought process was mostly logical and linear. Insight and judgment were intact. Patient denied SI, plan and/or means. Pt continues to be motivated for treatment.     Objective:    Patient was able to participate and benefit from treatment as evidenced by the patient s verbal expression and understanding of idea discussed.     New Symptoms or Complaints: The patient did not disclose any new symptoms to the undersigned.     Reason Patient is participating in the group: The group session was necessary for the care of the patient to address how symptoms impact the patient s mental health, relationships and overall well-being.      Patient's response to current intervention: Patient was receptive of feedback given, supportive of other group members and appears to have benefited from the group process. No barriers to learning evidenced.      Progress toward short-term goals: The patient completed the assigned homework which was to assess maladaptive thinking and its connections to the patient s pervious and/or current behaviors.     Patient's Impressions: The patient indicated readiness to learn by the patient s choice to attend group.      Review of long term goals: See treatment plan developed by outpatient counselor.    Are there any new goals: There were no new goals brought to the undersigned s attention.      The patient completed today s group session for the processing of Mental Health Symptoms and its relationship to the patient s Chemical Dependency. Group content and interventions performed in this session: Check-in, the connection between thoughts, feelings, and behaviors. Group members also talked about relationship conflicts and looking at the positive changes that have occurred.     Patient education on the above topics was provided during this session. The patient was given an opportunity to ask questions and participate in the group discussion. The patient exhibited individual understanding by asking relevant questions and making appropriate comments to other group members.    Patient indicated upon onset of group that his anxiety was a 2 out of 10. Patient reported he felt good.    Assessment:  1. Paranoid schizophrenia, chronic condition with acute exacerbation      No indication of SI, plan and/or means.     Providers Impression of Current Status:   Patient participated actively in today's group therapy session and appears to have learned the impact of one s thinking on feelings and behaviors. Patient will continue to benefit from ongoing psychotherapy that uses CBT and MI.     Psychotherapeutic Techniques: A cognitive behavioral modality was used.     PLAN:    Follow-up: Continue to attend groups to address mental health symptom(s)     Discharge Criteria Planning: Alleviation of mental health symptoms.     Encounter performed and documented by Petty Storm MA, [facility]

## 2021-06-10 NOTE — PROGRESS NOTES
Mental Health Processing Group    Date of Service: 05/17/2017 Start Time: 2:00 Stop Time: 3:00 Total Time: 60 minutes  Frequency: Wednesdays    CPT: 61211    Care Provider: Petty Storm MA, [facility]  N= 4    Vicarious interpretor was used for this session.     Subjective: The patient was seen today for a 60 minute group psychotherapy session held at [facility].     Mental Status Exam:    Patient was pleasant and cooperative. The patient was oriented X4. The patient made good eye contact and was well dressed with good grooming and hygiene. Recent and remote memories were intact. Concentration and focus was normal. Speech (tone, volume, and rate) were intact. Mood and affect were congruently anxious but appropriate for the content of the session. Fund of knowledge was normal. Thought process was mostly logical and linear. Insight and judgment were intact. Patient denied SI, plan and/or means. Pt continues to be motivated for treatment.     Objective:    Patient was able to participate and benefit from treatment as evidenced by the patient s verbal expression and understanding of idea discussed.     New Symptoms or Complaints: The patient did not disclose any new symptoms to the undersigned.     Reason Patient is participating in the group: The group session was necessary for the care of the patient to address how symptoms impact the patient s mental health, relationships and overall well-being.      Patient's response to current intervention: Patient was receptive of feedback given, supportive of other group members and appears to have benefited from the group process. No barriers to learning evidenced.      Progress toward short-term goals: The patient completed the assigned homework which was to assess maladaptive thinking and its connections to the patient s pervious and/or current behaviors.     Patient's Impressions: The patient indicated readiness to learn by the patient s choice to attend  group.     Review of long term goals: See treatment plan developed by outpatient counselor.    Are there any new goals: There were no new goals brought to the undersigned s attention.      The patient completed today s group session for the processing of Mental Health Symptoms and its relationship to the patient s Chemical Dependency. Group content and interventions performed in this session: Check-in, the connection between thoughts, feelings, and behaviors. Group members also talked about confronting depression and understanding roles as a parent.     Patient education on the above topics was provided during this session. The patient was given an opportunity to ask questions and participate in the group discussion. The patient exhibited individual understanding by asking relevant questions and making appropriate comments to other group members.    Patient indicated upon onset of group that his anxiety was a 2 out of 10. Patient reported he felt fine.    Assessment:  1. Paranoid schizophrenia, chronic condition with acute exacerbation      No indication of SI, plan and/or means.     Providers Impression of Current Status:   Patient participated actively in today's group therapy session and appears to have learned the impact of one s thinking on feelings and behaviors. Patient will continue to benefit from ongoing psychotherapy that uses CBT and MI.     Psychotherapeutic Techniques: A cognitive behavioral modality was used.     PLAN:    Follow-up: Continue to attend groups to address mental health symptom(s)     Discharge Criteria Planning: Alleviation of mental health symptoms.     Encounter performed and documented by Petty Storm MA, [facility]

## 2021-06-10 NOTE — PROGRESS NOTES
OUTPATIENT PROGRESS NOTE      Date of visit May 30, 2017    Welsh name  Wayoln Murdock  Mother's name Ilene Nichole    Reasons For Visit Are Multiple Today:   1.  Culturally sensitive follow-up for mental health issues  2.  New onset involuntary movements of the torso and restlessness  3.  Follow-up for schizoaffective disorder, no symptoms of psychosis at this time  4.  Follow-up for david, no symptoms of david at this time  5.  Follow-up for excessive masturbation, no symptoms at this time  6.  Follow-up for insomnia, resolved   7. Question about driving and safety of driving as the patient was recommended to drive only during low traffic.  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    8.  High risk medication use: New onset involuntary movements are likely related to Prolixin  9.  Adjustment of medications      10.  Welsh cultural issues  11.  Education on current medication regimen  12.  Education on future follow-up appointments  13.  Ego activities: The patient is active in [employer], singing in the [employer] choir, upcoming wedding of his younger brother  14.  Collateral information from his mother who is present for the interview today as per patient's request, she confirms the above issues and reports  15.  Family conference to discuss treatment plan and adjustment of medications, etiology of movements  16. High risk medication use assessment of movements, discus  score 4    History of present illness/Subjective:  The patient is Welsh speaking. The interview is conducted in Welsh language, translated personally by me into English records which adds additional element of complexity to this visit and my assessment. multiple questions and issues were addressed as reflected above.  Treatment plan was discussed in details.  patient is calm, cooperative.  e  No behavioral stated evidence of ee xcessive activation.  No behavioral stated evidence of symptoms of david or psychosis or other mood symptoms (behaviors.  Denying SI/HI/voices/visions/telepathic communication/imaginary girlfriends/thoughts of cutting his thumb off.  Appears to be at baseline.  Compliant.  Appears to have a new onset repetitive waving torso movements, seems to be pacing at times.  He is not aware of the movements for the most part.  Says that they are not distressing or uncomfortable.     No psychomotor rigidity or spasms.  So it is not clear for me at this time with them the above movements represent akathisia or tardive dyskinesia.  The family really does not want to stop Prolixin at this time, because of the multiple failed trials of other antipsychotics, including Zyprexa, Risperdal, Abilify, Haldol.  They want to continue Prolixin but at the lower dose.  In case if this is a tardive dyskinesia, it may worsen with the lower dose.  If it is akathisia, it may improve.  I explained this to the family.  Also advised him to follow-up with [facility] movement disorder clinic, and they agreed.      Medications:      Current Outpatient Prescriptions   Medication Sig Dispense Refill     CYANOCOBALAMIN, VITAMIN B-12, (VITAMIN B12 ORAL) Take by mouth daily.       fluPHENAZine (PROLIXIN) 5 MG tablet Take 1 tablet (5 mg total) by mouth 2 (two) times a day. 60 tablet 5     propranolol (INDERAL) 10 MG tablet Take 1 tablet (10 mg total) by mouth 2 (two) times a day as needed. 30 tablet 0     No current facility-administered medications for this visit.          Family history/Social history   Lives with his parents.  Unemployed on [employer].  Volunteers at [email or web address]          Procedures:  1. Coordination of care: nursing notes, vital signs, multiple records of communication between the patient and the clinic, communication with the pharmacy, and multiple medication orders were reviewed signed and  "discussed with the patient. Multiple chart entries were reviewed in preparation of documentation and generation of documentation.  2.  Education: was provided on diagnosis, medication regimen, psychotherapeutic treatment modalities, future follow-up appointments.  3.  Counseling: was provided on coping with mental illness.  4.  Collateral information was obtained from  mother  5.  Coordination of care: I  entered all orders and educated the patient as above  6.  Coordination of care: I personally coordinated all medication orders, follow up orders, pharmacy requests, and provided the patient with detailed instructions  in his native [place]an language  7. Ug[place] cultural and immigration issues were addressed, the interview was conducted in Ugandan language, Ugandan medications were discussed as it is common in [place] speaking community to take Russian produced medications which could be dangerous. The patient takes  none at this time  8. Family  conference: as above    Review Of Systems:  As above.   Hypersomnia with melatonin.  The reminder of 10 systems was negative.      Vital Signs:    /80 (Patient Site: Right Arm, Patient Position: Sitting, Cuff Size: Adult Large)  Pulse 86  Temp 97.5  F (36.4  C) (Oral)   Ht 5' 6\" (1.676 m)  Wt (!) 245 lb (111.1 kg)  BMI 39.54 kg/m2    Mental Status Examination:     Appearance  calm    Alert and oriented ×3    Attention and concentration  normal    Speech  fluent    Mood  good    Affect  appropriate    Thought processing  logical    Associations  normal    Thought content  no SI/HI/psychosis    Language  normal in Ugandan    Short term memory  normal    Long term memory  normal    Fund of knowledge  normal    Psychomotor activity  normal    Gait and station  normal    Insight and judgment  normal        Limited Physical Examination:  Was performed  due to previous side effects/tremors with Prolixin.  Normal psychomotor activity based on my observation.  Adequately " nourished.      Laboratory Data:    personally reviewed.   No visits with results within 2 Month(s) from this visit.  Latest known visit with results is:    Admission on 07/16/2016, Discharged on 08/18/2016   Component Date Value     Bilirubin, Total 07/27/2016 0.4      Bilirubin, Direct 07/27/2016 0.1      Protein, Total 07/27/2016 7.6      Albumin 07/27/2016 3.9      Alkaline Phosphatase 07/27/2016 91      AST 07/27/2016 20      ALT 07/27/2016 48*     WBC 07/27/2016 7.8      RBC 07/27/2016 5.11      Hemoglobin 07/27/2016 14.7      Hematocrit 07/27/2016 44.3      MCV 07/27/2016 87      MCH 07/27/2016 28.8      MCHC 07/27/2016 33.2      RDW 07/27/2016 12.5      Platelets 07/27/2016 291      MPV 07/27/2016 10.1      Neutrophils % 07/27/2016 63      Lymphocytes % 07/27/2016 29      Monocytes % 07/27/2016 6      Eosinophils % 07/27/2016 2      Basophils % 07/27/2016 1      Neutrophils Absolute 07/27/2016 4.9      Lymphocytes Absolute 07/27/2016 2.3      Monocytes Absolute 07/27/2016 0.5      Eosinophils Absolute 07/27/2016 0.1      Basophils Absolute 07/27/2016 0.0          Diagnosis:  Past Medical History:   Diagnosis Date     Bipolar I disorder      Calcified granuloma of lung 2014     Obesity (BMI 35.0-39.9 without comorbidity)      Paranoid schizophrenia      Vitamin B 12 deficiency        Patient Active Problem List   Diagnosis     Mood disorder in conditions classified elsewhere     Left arm pain     Dandruff     Other dysfunctions of sleep stages or arousal from sleep     Weight gain     Back ache     Clinical depression     Chronic paranoid schizophrenia     Neuroleptic consent form discussed and signed: July 17, 2016           Schizoaffective disorder, chronic  insomnia, resolved          Treatment Plan:  1. The patient was provided with education and detailed written and verbal instructions in native language on diagnosis, future follow-up, and treatment plan, same instructions were also provided in English  language.   2. Instructed to return to clinic in  3-4 months or sooner if needed.  3. Nurse only clinic is available in the interim if needed.  4. Crisis plan in place.  5. Referral to a culturally sensitive Russian speaking therapist Dr. Albarran was provided.    6.  Decrease Prolixin 5 mg twice daily, the reasons explained above  7.  [facility] movement disorder clinic for consultation for tardive dyskinesia versus akathisia and recommendation on treatment  8.  Driving as per recommendations of safety of driving evaluation, only during low traffic area  9.  Close monitoring for side effects  10.  Will add Inderal 10 mg twice daily as needed for restlessness/akathisia         Total time today is 40 minutes, including efforts to contact patient's mother per her request, more than 50% of time was coordination of care, education, counseling, family conference, and collateral information, Iraqi cultural issues, review of pertinent hospital and clinic records and multiple chart and Epic  entries in preparation and generation of pertinent documentation, generation of multiple Epic entries and visit related orders, other details are fully reflected in the procedures paragraph. Please see associated nursing records for other details.         This note was created by carrieigned using a Dragon dictation system. All typing errors or contextual distortion are unintentional and software inherent.     Sue Johnson MD

## 2021-06-10 NOTE — PROGRESS NOTES
Mental Health Processing Group    Date of Service: 05/10/2017 Start Time: 2:00 Stop Time: 3:00 Total Time: 60 minutes  Frequency: Wednesdays    CPT: 78299    Care Provider: Petty Storm MA, [facility]  N= 4    Boonty interpretor was used for this session.     Subjective: The patient was seen today for a 60 minute group psychotherapy session held at [facility].     Mental Status Exam:    Patient was pleasant and cooperative. The patient was oriented X4. The patient made good eye contact and was well dressed with good grooming and hygiene. Recent and remote memories were intact. Concentration and focus was normal. Speech (tone, volume, and rate) were intact. Mood and affect were congruently anxious but appropriate for the content of the session. Fund of knowledge was normal. Thought process was mostly logical and linear. Insight and judgment were intact. Patient denied SI, plan and/or means. Pt continues to be motivated for treatment.     Objective:    Patient was able to participate and benefit from treatment as evidenced by the patient s verbal expression and understanding of idea discussed.     New Symptoms or Complaints: The patient did not disclose any new symptoms to the undersigned.     Reason Patient is participating in the group: The group session was necessary for the care of the patient to address how symptoms impact the patient s mental health, relationships and overall well-being.      Patient's response to current intervention: Patient was receptive of feedback given, supportive of other group members and appears to have benefited from the group process. No barriers to learning evidenced.      Progress toward short-term goals: The patient completed the assigned homework which was to assess maladaptive thinking and its connections to the patient s pervious and/or current behaviors.     Patient's Impressions: The patient indicated readiness to learn by the patient s choice to attend  group.     Review of long term goals: See treatment plan developed by outpatient counselor.    Are there any new goals: There were no new goals brought to the undersigned s attention.      The patient completed today s group session for the processing of Mental Health Symptoms and its relationship to the patient s Chemical Dependency. Group content and interventions performed in this session: Check-in, the connection between thoughts, feelings, and behaviors. Group members also talked about noticing triggers to mental health symptoms.     Patient education on the above topics was provided during this session. The patient was given an opportunity to ask questions and participate in the group discussion. The patient exhibited individual understanding by asking relevant questions and making appropriate comments to other group members.    Patient indicated upon onset of group that his anxiety was a 2 out of 10. Patient reported he felt fine.     Assessment:  1. Paranoid schizophrenia, chronic condition with acute exacerbation      No indication of SI, plan and/or means.     Providers Impression of Current Status:   Patient participated actively in today's group therapy session and appears to have learned the impact of one s thinking on feelings and behaviors. Patient will continue to benefit from ongoing psychotherapy that uses CBT and MI.     Psychotherapeutic Techniques: A cognitive behavioral modality was used.     PLAN:    Follow-up: Continue to attend groups to address mental health symptom(s)     Discharge Criteria Planning: Alleviation of mental health symptoms.     Encounter performed and documented by Petty Storm MA, [facility]

## 2021-06-10 NOTE — PROGRESS NOTES
Mental Health Processing Group    Date of Service: 04/12/2017 Start Time: 2:00 Stop Time: 3:00 Total Time: 60 minutes  Frequency: Wednesdays    CPT: 16142    Care Provider: Petty Storm MA, [facility]  N= 4    [employer] interpretor was used for this session.     Subjective: The patient was seen today for a 60 minute group psychotherapy session held at [facility].     Mental Status Exam:    Patient was pleasant and cooperative. The patient was oriented X4. The patient made good eye contact and was well dressed with good grooming and hygiene. Recent and remote memories were intact. Concentration and focus was normal. Speech (tone, volume, and rate) were intact. Mood and affect were congruently anxious but appropriate for the content of the session. Fund of knowledge was normal. Thought process was mostly logical and linear. Insight and judgment were intact. Patient denied SI, plan and/or means. Pt continues to be motivated for treatment.     Objective:    Patient was able to participate and benefit from treatment as evidenced by the patient s verbal expression and understanding of idea discussed.     New Symptoms or Complaints: The patient did not disclose any new symptoms to the undersigned.     Reason Patient is participating in the group: The group session was necessary for the care of the patient to address how symptoms impact the patient s mental health, relationships and overall well-being.      Patient's response to current intervention: Patient was receptive of feedback given, supportive of other group members and appears to have benefited from the group process. No barriers to learning evidenced.      Progress toward short-term goals: The patient completed the assigned homework which was to assess maladaptive thinking and its connections to the patient s pervious and/or current behaviors.     Patient's Impressions: The patient indicated readiness to learn by the patient s choice to attend  group.     Review of long term goals: See treatment plan developed by outpatient counselor.    Are there any new goals: There were no new goals brought to the undersigned s attention.      The patient completed today s group session for the processing of Mental Health Symptoms and its relationship to the patient s Chemical Dependency. Group content and interventions performed in this session: Check-in, the connection between thoughts, feelings, and behaviors. Group members also talked about getting through hard times and focusing on the moment.     Patient education on the above topics was provided during this session. The patient was given an opportunity to ask questions and participate in the group discussion. The patient exhibited individual understanding by asking relevant questions and making appropriate comments to other group members.    Patient indicated upon onset of group that his anxiety was a 2 out of 10. Patient reported he felt good.    Assessment:  1. Paranoid schizophrenia, chronic condition with acute exacerbation      No indication of SI, plan and/or means.     Providers Impression of Current Status:   Patient participated actively in today's group therapy session and appears to have learned the impact of one s thinking on feelings and behaviors. Patient will continue to benefit from ongoing psychotherapy that uses CBT and MI.     Psychotherapeutic Techniques: A cognitive behavioral modality was used.     PLAN:    Follow-up: Continue to attend groups to address mental health symptom(s)     Discharge Criteria Planning: Alleviation of mental health symptoms.     Encounter performed and documented by Petty Storm MA, [facility]

## 2021-06-10 NOTE — PROGRESS NOTES
Mental Health Processing Group    Date of Service: 04/26/2017 Start Time: 2:00 Stop Time: 3:00 Total Time: 60 minutes  Frequency: Wednesdays    CPT: 90419    Care Provider: Petty Storm MA, [facility]  N= 5    [employer] interpretor was used for this session.     Subjective: The patient was seen today for a 60 minute group psychotherapy session held at [facility].     Mental Status Exam:    Patient was pleasant and cooperative. The patient was oriented X4. The patient made good eye contact and was well dressed with good grooming and hygiene. Recent and remote memories were intact. Concentration and focus was normal. Speech (tone, volume, and rate) were intact. Mood and affect were congruently anxious but appropriate for the content of the session. Fund of knowledge was normal. Thought process was mostly logical and linear. Insight and judgment were intact. Patient denied SI, plan and/or means. Pt continues to be motivated for treatment.     Objective:    Patient was able to participate and benefit from treatment as evidenced by the patient s verbal expression and understanding of idea discussed.     New Symptoms or Complaints: The patient did not disclose any new symptoms to the undersigned.     Reason Patient is participating in the group: The group session was necessary for the care of the patient to address how symptoms impact the patient s mental health, relationships and overall well-being.      Patient's response to current intervention: Patient was receptive of feedback given, supportive of other group members and appears to have benefited from the group process. No barriers to learning evidenced.      Progress toward short-term goals: The patient completed the assigned homework which was to assess maladaptive thinking and its connections to the patient s pervious and/or current behaviors.     Patient's Impressions: The patient indicated readiness to learn by the patient s choice to attend  group.     Review of long term goals: See treatment plan developed by outpatient counselor.    Are there any new goals: There were no new goals brought to the undersigned s attention.      The patient completed today s group session for the processing of Mental Health Symptoms and its relationship to the patient s Chemical Dependency. Group content and interventions performed in this session: Check-in, the connection between thoughts, feelings, and behaviors. Group members also talked about reflecting on changes and ways to reduce mental health symptoms.     Patient education on the above topics was provided during this session. The patient was given an opportunity to ask questions and participate in the group discussion. The patient exhibited individual understanding by asking relevant questions and making appropriate comments to other group members.    Patient indicated upon onset of group that his anxiety was a 2 out of 10. Patient reported he felt okay.    Assessment:  1. Paranoid schizophrenia, chronic condition with acute exacerbation      No indication of SI, plan and/or means.     Providers Impression of Current Status:   Patient participated actively in today's group therapy session and appears to have learned the impact of one s thinking on feelings and behaviors. Patient will continue to benefit from ongoing psychotherapy that uses CBT and MI.     Psychotherapeutic Techniques: A cognitive behavioral modality was used.     PLAN:    Follow-up: Continue to attend groups to address mental health symptom(s)     Discharge Criteria Planning: Alleviation of mental health symptoms.     Encounter performed and documented by Petty Storm MA, [facility]

## 2021-06-10 NOTE — PROGRESS NOTES
Pt is here for routine psychiatric med management follow up. Client is accompanied by his mother today. He reports stable mood. Mother is concerned about abnormal movement of his upper body and foot tapping as well. DISCUS exam performed today.     Correct pharmacy verified with patient and confirmed in snapshot? [x] yes []no    Medications Phoned  to Pharmacy [] yes [x]no  Name of Pharmacist:  List Medications, including dose, quantity and instructions      Medication Prescriptions given to patient   [] yes  [x] no   List the name of the drug the prescription was written for.       Medications ordered this visit were e-scribed.  Verified by order class [x] yes  [] no  Propranolol and Prolixin  Medication changes or discontinuations were communicated to patient's pharmacy: [x] yes  [] no   LM for [facility] updating of the Prolixin dose decrease to 5 mg BID    UA collected [] yes    [x] no    [place] Prescription Monitoring Program Reviewed? [] yes  [x] no    Referrals were made to:  Dr. Crow, neurology. [facility]  Faxed face sheet, referral, visit notes, discus exam from today and 5/12/16 to [phone]    Future appointment was made: [x] yes  [] no  9/7/17  Dictation completed at time of chart check: [x] yes  [] no    I have checked the documentation for today s encounters and the above information has been reviewed and completed.

## 2021-06-10 NOTE — PROGRESS NOTES
Mental Health Processing Group    Date of Service: 04/5/2017 Start Time: 2:00 Stop Time: 3:00 Total Time: 60 minutes  Frequency: Wednesdays    CPT: 81518    Care Provider: Petty Storm MA, [facility]  N= 5    [employer] interpretor was used for this session.     Subjective: The patient was seen today for a 60 minute group psychotherapy session held at [facility].     Mental Status Exam:    Patient was pleasant and cooperative. The patient was oriented X4. The patient made good eye contact and was well dressed with good grooming and hygiene. Recent and remote memories were intact. Concentration and focus was normal. Speech (tone, volume, and rate) were intact. Mood and affect were congruently anxious but appropriate for the content of the session. Fund of knowledge was normal. Thought process was mostly logical and linear. Insight and judgment were intact. Patient denied SI, plan and/or means. Pt continues to be motivated for treatment.     Objective:    Patient was able to participate and benefit from treatment as evidenced by the patient s verbal expression and understanding of idea discussed.     New Symptoms or Complaints: The patient did not disclose any new symptoms to the undersigned.     Reason Patient is participating in the group: The group session was necessary for the care of the patient to address how symptoms impact the patient s mental health, relationships and overall well-being.      Patient's response to current intervention: Patient was receptive of feedback given, supportive of other group members and appears to have benefited from the group process. No barriers to learning evidenced.      Progress toward short-term goals: The patient completed the assigned homework which was to assess maladaptive thinking and its connections to the patient s pervious and/or current behaviors.     Patient's Impressions: The patient indicated readiness to learn by the patient s choice to attend  group.     Review of long term goals: See treatment plan developed by outpatient counselor.    Are there any new goals: There were no new goals brought to the undersigned s attention.      The patient completed today s group session for the processing of Mental Health Symptoms and its relationship to the patient s Chemical Dependency. Group content and interventions performed in this session: Check-in, the connection between thoughts, feelings, and behaviors. Group members also talked about external struggles with mental health and staying in the moment.     Patient education on the above topics was provided during this session. The patient was given an opportunity to ask questions and participate in the group discussion. The patient exhibited individual understanding by asking relevant questions and making appropriate comments to other group members.    Patient indicated upon onset of group that his anxiety was a 3 out of 10. Patient reported he felt good.    Assessment:  1. Paranoid schizophrenia, chronic condition with acute exacerbation      No indication of SI, plan and/or means.     Providers Impression of Current Status:   Patient participated actively in today's group therapy session and appears to have learned the impact of one s thinking on feelings and behaviors. Patient will continue to benefit from ongoing psychotherapy that uses CBT and MI.     Psychotherapeutic Techniques: A cognitive behavioral modality was used.     PLAN:    Follow-up: Continue to attend groups to address mental health symptom(s)     Discharge Criteria Planning: Alleviation of mental health symptoms.     Encounter performed and documented by Petty Storm MA, [facility]

## 2021-06-11 NOTE — PROGRESS NOTES
Mental Health Processing Group    Date of Service: 07/05/2017 Start Time: 2:00 Stop Time: 3:00 Total Time: 60 minutes  Frequency: Wednesdays    CPT: 94956    Care Provider: Petty Storm MA, [facility]  N= 5    [employer] interpretor was used for this session.     Subjective: The patient was seen today for a 60 minute group psychotherapy session held at [facility].     Mental Status Exam:    Patient was pleasant and cooperative. The patient was oriented X4. The patient made good eye contact and was well dressed with good grooming and hygiene. Recent and remote memories were intact. Concentration and focus was normal. Speech (tone, volume, and rate) were intact. Mood and affect were congruently anxious but appropriate for the content of the session. Fund of knowledge was normal. Thought process was mostly logical and linear. Insight and judgment were intact. Patient denied SI, plan and/or means. Pt continues to be motivated for treatment.     Objective:    Patient was able to participate and benefit from treatment as evidenced by the patient s verbal expression and understanding of idea discussed.     New Symptoms or Complaints: The patient did not disclose any new symptoms to the undersigned.     Reason Patient is participating in the group: The group session was necessary for the care of the patient to address how symptoms impact the patient s mental health, relationships and overall well-being.      Patient's response to current intervention: Patient was receptive of feedback given, supportive of other group members and appears to have benefited from the group process. No barriers to learning evidenced.      Progress toward short-term goals: The patient completed the assigned homework which was to assess maladaptive thinking and its connections to the patient s pervious and/or current behaviors.     Patient's Impressions: The patient indicated readiness to learn by the patient s choice to attend  group.     Review of long term goals: See treatment plan developed by outpatient counselor.    Are there any new goals: There were no new goals brought to the undersigned s attention.      The patient completed today s group session for the processing of Mental Health Symptoms and its relationship to the patient s Chemical Dependency. Group content and interventions performed in this session: Check-in, the connection between thoughts, feelings, and behaviors. Group members also talked about struggles with living situations and handling everyday stressors.     Patient education on the above topics was provided during this session. The patient was given an opportunity to ask questions and participate in the group discussion. The patient exhibited individual understanding by asking relevant questions and making appropriate comments to other group members.    Patient indicated upon onset of group that his anxiety was a 3 out of 10. Patient reported he felt okay.    Assessment:  1. Paranoid schizophrenia, chronic condition with acute exacerbation      No indication of SI, plan and/or means.     Providers Impression of Current Status:   Patient participated actively in today's group therapy session and appears to have learned the impact of one s thinking on feelings and behaviors. Patient will continue to benefit from ongoing psychotherapy that uses CBT and MI.     Psychotherapeutic Techniques: A cognitive behavioral modality was used.     PLAN:    Follow-up: Continue to attend groups to address mental health symptom(s)     Discharge Criteria Planning: Alleviation of mental health symptoms.     Encounter performed and documented by Petty Storm MA, [facility]

## 2021-06-11 NOTE — PROGRESS NOTES
Mental Health Processing Group    Date of Service: 05/31/2017 Start Time: 2:00 Stop Time: 3:00 Total Time: 60 minutes  Frequency: Wednesdays    CPT: 17822    Care Provider: Petty Storm MA, [facility]  N= 6    [employer] interpretor was used for this session.    Subjective: The patient was seen today for a 60 minute group psychotherapy session held at [facility].     Mental Status Exam:    Patient was pleasant and cooperative. The patient was oriented X4. The patient made good eye contact and was well dressed with good grooming and hygiene. Recent and remote memories were intact. Concentration and focus was normal. Speech (tone, volume, and rate) were intact. Mood and affect were congruently anxious but appropriate for the content of the session. Fund of knowledge was normal. Thought process was mostly logical and linear. Insight and judgment were intact. Patient denied SI, plan and/or means. Pt continues to be motivated for treatment.     Objective:    Patient was able to participate and benefit from treatment as evidenced by the patient s verbal expression and understanding of idea discussed.     New Symptoms or Complaints: The patient did not disclose any new symptoms to the undersigned.     Reason Patient is participating in the group: The group session was necessary for the care of the patient to address how symptoms impact the patient s mental health, relationships and overall well-being.      Patient's response to current intervention: Patient was receptive of feedback given, supportive of other group members and appears to have benefited from the group process. No barriers to learning evidenced.      Progress toward short-term goals: The patient completed the assigned homework which was to assess maladaptive thinking and its connections to the patient s pervious and/or current behaviors.     Patient's Impressions: The patient indicated readiness to learn by the patient s choice to attend  group.     Review of long term goals: See treatment plan developed by outpatient counselor.    Are there any new goals: There were no new goals brought to the undersigned s attention.      The patient completed today s group session for the processing of Mental Health Symptoms and its relationship to the patient s Chemical Dependency. Group content and interventions performed in this session: Check-in, the connection between thoughts, feelings, and behaviors. Group members also talked about ways they struggle with avoidance, sleep concerns and reducing anxiety.     Patient education on the above topics was provided during this session. The patient was given an opportunity to ask questions and participate in the group discussion. The patient exhibited individual understanding by asking relevant questions and making appropriate comments to other group members.    Patient indicated upon onset of group that his anxiety was a 2 out of 10. Patient reported he felt fine.    Assessment:  1. Paranoid schizophrenia, chronic condition with acute exacerbation      No indication of SI, plan and/or means.     Providers Impression of Current Status:   Patient participated actively in today's group therapy session and appears to have learned the impact of one s thinking on feelings and behaviors. Patient will continue to benefit from ongoing psychotherapy that uses CBT and MI.     Psychotherapeutic Techniques: A cognitive behavioral modality was used.     PLAN:    Follow-up: Continue to attend groups to address mental health symptom(s)     Discharge Criteria Planning: Alleviation of mental health symptoms.     Encounter performed and documented by Petty Storm MA, [facility]

## 2021-06-11 NOTE — PROGRESS NOTES
Mental Health Processing Group    Date of Service: 05/24/2017 Start Time: 2:00 Stop Time: 3:00 Total Time: 60 minutes  Frequency: Wednesdays    CPT: [id number]    Care Provider: Petty Storm MA, [facility]  N= 3    Subjective: The patient was seen today for a 60 minute group psychotherapy session held at [facility].     Mental Status Exam:    Patient was pleasant and cooperative. The patient was oriented X4. The patient made good eye contact and was well dressed with good grooming and hygiene. Recent and remote memories were intact. Concentration and focus was normal. Speech (tone, volume, and rate) were intact. Mood and affect were congruently anxious but appropriate for the content of the session. Fund of knowledge was normal. Thought process was mostly logical and linear. Insight and judgment were intact. Patient denied SI, plan and/or means. Pt continues to be motivated for treatment.     Objective:    Patient was able to participate and benefit from treatment as evidenced by the patient s verbal expression and understanding of idea discussed.     New Symptoms or Complaints: The patient did not disclose any new symptoms to the undersigned.     Reason Patient is participating in the group: The group session was necessary for the care of the patient to address how symptoms impact the patient s mental health, relationships and overall well-being.      Patient's response to current intervention: Patient was receptive of feedback given, supportive of other group members and appears to have benefited from the group process. No barriers to learning evidenced.      Progress toward short-term goals: The patient completed the assigned homework which was to assess maladaptive thinking and its connections to the patient s pervious and/or current behaviors.     Patient's Impressions: The patient indicated readiness to learn by the patient s choice to attend group.     Review of long term goals: See treatment plan  developed by outpatient counselor.    Are there any new goals: There were no new goals brought to the undersigned s attention.      The patient completed today s group session for the processing of Mental Health Symptoms and its relationship to the patient s Chemical Dependency. Group content and interventions performed in this session: Check-in, the connection between thoughts, feelings, and behaviors. Group members also talked about ways to handle situations out of their control without getting angry or depressed.     Patient education on the above topics was provided during this session. The patient was given an opportunity to ask questions and participate in the group discussion. The patient exhibited individual understanding by asking relevant questions and making appropriate comments to other group members.    Patient indicated upon onset of group that his anxiety was a 2 out of 10. Patient reported he felt fine.    Assessment:  1. Paranoid schizophrenia, chronic condition with acute exacerbation      No indication of SI, plan and/or means.     Providers Impression of Current Status:   Patient participated actively in today's group therapy session and appears to have learned the impact of one s thinking on feelings and behaviors. Patient will continue to benefit from ongoing psychotherapy that uses CBT and MI.     Psychotherapeutic Techniques: A cognitive behavioral modality was used.     PLAN:    Follow-up: Continue to attend groups to address mental health symptom(s)     Discharge Criteria Planning: Alleviation of mental health symptoms.     Encounter performed and documented by Petty Storm MA, [facility]

## 2021-06-11 NOTE — PROGRESS NOTES
Mental Health Processing Group    Date of Service: 07/12/2017 Start Time: 2:00 Stop Time: 3:00 Total Time: 60 minutes  Frequency: Wednesdays    CPT: [id number]    Care Provider: Petty Storm MA, [facility]  N= 5    [employer] interpretor was used for this session.     Subjective: The patient was seen today for a 60 minute group psychotherapy session held at [facility].     Mental Status Exam:    Patient was pleasant and cooperative. The patient was oriented X4. The patient made good eye contact and was well dressed with good grooming and hygiene. Recent and remote memories were intact. Concentration and focus was normal. Speech (tone, volume, and rate) were intact. Mood and affect were congruently anxious but appropriate for the content of the session. Fund of knowledge was normal. Thought process was mostly logical and linear. Insight and judgment were intact. Patient denied SI, plan and/or means. Pt continues to be motivated for treatment.     Objective:    Patient was able to participate and benefit from treatment as evidenced by the patient s verbal expression and understanding of idea discussed.     New Symptoms or Complaints: The patient did not disclose any new symptoms to the undersigned.     Reason Patient is participating in the group: The group session was necessary for the care of the patient to address how symptoms impact the patient s mental health, relationships and overall well-being.      Patient's response to current intervention: Patient was receptive of feedback given, supportive of other group members and appears to have benefited from the group process. No barriers to learning evidenced.      Progress toward short-term goals: The patient completed the assigned homework which was to assess maladaptive thinking and its connections to the patient s pervious and/or current behaviors.     Patient's Impressions: The patient indicated readiness to learn by the patient s choice to attend  group.     Review of long term goals: See treatment plan developed by outpatient counselor.    Are there any new goals: There were no new goals brought to the undersigned s attention.      The patient completed today s group session for the processing of Mental Health Symptoms and its relationship to the patient s Chemical Dependency. Group content and interventions performed in this session: Check-in, the connection between thoughts, feelings, and behaviors. Group members also talked about conflict solution and staying in the moment.     Patient education on the above topics was provided during this session. The patient was given an opportunity to ask questions and participate in the group discussion. The patient exhibited individual understanding by asking relevant questions and making appropriate comments to other group members.    Patient indicated upon onset of group that his anxiety was a 2 out of 10. Patient reported he felt good.    Assessment:  1. Paranoid schizophrenia, chronic condition with acute exacerbation      No indication of SI, plan and/or means.     Providers Impression of Current Status:   Patient participated actively in today's group therapy session and appears to have learned the impact of one s thinking on feelings and behaviors. Patient will continue to benefit from ongoing psychotherapy that uses CBT and MI.     Psychotherapeutic Techniques: A cognitive behavioral modality was used.     PLAN:    Follow-up: Continue to attend groups to address mental health symptom(s)     Discharge Criteria Planning: Alleviation of mental health symptoms.     Encounter performed and documented by Petty Storm MA, [facility]

## 2021-06-11 NOTE — PROGRESS NOTES
Mental Health Processing Group    Date of Service: 06/14/2017 Start Time: 2:00 Stop Time: 3:00 Total Time: 60 minutes  Frequency: Wednesdays    CPT: 02047    Care Provider: Petty Storm MA, [facility]  N= 4    [employer] interpretor was used for this session.     Subjective: The patient was seen today for a 60 minute group psychotherapy session held at [facility].     Mental Status Exam:    Patient was pleasant and cooperative. The patient was oriented X4. The patient made good eye contact and was well dressed with good grooming and hygiene. Recent and remote memories were intact. Concentration and focus was normal. Speech (tone, volume, and rate) were intact. Mood and affect were congruently anxious but appropriate for the content of the session. Fund of knowledge was normal. Thought process was mostly logical and linear. Insight and judgment were intact. Patient denied SI, plan and/or means. Pt continues to be motivated for treatment.     Objective:    Patient was able to participate and benefit from treatment as evidenced by the patient s verbal expression and understanding of idea discussed.     New Symptoms or Complaints: The patient did not disclose any new symptoms to the undersigned.     Reason Patient is participating in the group: The group session was necessary for the care of the patient to address how symptoms impact the patient s mental health, relationships and overall well-being.      Patient's response to current intervention: Patient was receptive of feedback given, supportive of other group members and appears to have benefited from the group process. No barriers to learning evidenced.      Progress toward short-term goals: The patient completed the assigned homework which was to assess maladaptive thinking and its connections to the patient s pervious and/or current behaviors.     Patient's Impressions: The patient indicated readiness to learn by the patient s choice to attend  group.     Review of long term goals: See treatment plan developed by outpatient counselor.    Are there any new goals: There were no new goals brought to the undersigned s attention.      The patient completed today s group session for the processing of Mental Health Symptoms and its relationship to the patient s Chemical Dependency. Group content and interventions performed in this session: Check-in, the connection between thoughts, feelings, and behaviors. Group members also talked about struggling with letting go and confronting emotions.     Patient education on the above topics was provided during this session. The patient was given an opportunity to ask questions and participate in the group discussion. The patient exhibited individual understanding by asking relevant questions and making appropriate comments to other group members.    Patient indicated upon onset of group that his anxiety was a 2 out of 10. Patient reported he felt fine.    Assessment:  1. Paranoid schizophrenia, chronic condition with acute exacerbation      No indication of SI, plan and/or means.     Providers Impression of Current Status:   Patient participated actively in today's group therapy session and appears to have learned the impact of one s thinking on feelings and behaviors. Patient will continue to benefit from ongoing psychotherapy that uses CBT and MI.     Psychotherapeutic Techniques: A cognitive behavioral modality was used.     PLAN:    Follow-up: Continue to attend groups to address mental health symptom(s)     Discharge Criteria Planning: Alleviation of mental health symptoms.     Encounter performed and documented by Petty Storm MA, [facility]

## 2021-06-12 NOTE — PROGRESS NOTES
Pt here for culturally sensitive psych follow up. Pt states things are going good and he has no complaints or concerns to discuss. Pt states he is not taking the propranolol, but is not sure.

## 2021-06-12 NOTE — PROGRESS NOTES
Mental Health Processing Group    Date of Service: 07/19/2017 Start Time: 2:00 Stop Time: 3:00 Total Time: 60 minutes  Frequency: Wednesdays    CPT: 11957    Care Provider: Petty Storm MA, [facility]  N= 2    [employer] interpretor was used for this session.     Subjective: The patient was seen today for a 60 minute group psychotherapy session held at [facility].     Mental Status Exam:    Patient was pleasant and cooperative. The patient was oriented X4. The patient made good eye contact and was well dressed with good grooming and hygiene. Recent and remote memories were intact. Concentration and focus was normal. Speech (tone, volume, and rate) were intact. Mood and affect were congruently anxious but appropriate for the content of the session. Fund of knowledge was normal. Thought process was mostly logical and linear. Insight and judgment were intact. Patient denied SI, plan and/or means. Pt continues to be motivated for treatment.     Objective:    Patient was able to participate and benefit from treatment as evidenced by the patient s verbal expression and understanding of idea discussed.     New Symptoms or Complaints: The patient did not disclose any new symptoms to the undersigned.     Reason Patient is participating in the group: The group session was necessary for the care of the patient to address how symptoms impact the patient s mental health, relationships and overall well-being.      Patient's response to current intervention: Patient was receptive of feedback given, supportive of other group members and appears to have benefited from the group process. No barriers to learning evidenced.      Progress toward short-term goals: The patient completed the assigned homework which was to assess maladaptive thinking and its connections to the patient s pervious and/or current behaviors.     Patient's Impressions: The patient indicated readiness to learn by the patient s choice to attend  group.     Review of long term goals: See treatment plan developed by outpatient counselor.    Are there any new goals: There were no new goals brought to the undersigned s attention.      The patient completed today s group session for the processing of Mental Health Symptoms and its relationship to the patient s Chemical Dependency. Group content and interventions performed in this session: Check-in, the connection between thoughts, feelings, and behaviors. Group members also talked about overcoming challenges and trying to stay in the moment.     Patient education on the above topics was provided during this session. The patient was given an opportunity to ask questions and participate in the group discussion. The patient exhibited individual understanding by asking relevant questions and making appropriate comments to other group members.    Patient indicated upon onset of group that his anxiety was a 3 out of 10. Patient reported he felt sad.    Assessment:  1. Paranoid schizophrenia, chronic condition with acute exacerbation      No indication of SI, plan and/or means.     Providers Impression of Current Status:   Patient participated actively in today's group therapy session and appears to have learned the impact of one s thinking on feelings and behaviors. Patient will continue to benefit from ongoing psychotherapy that uses CBT and MI.     Psychotherapeutic Techniques: A cognitive behavioral modality was used.     PLAN:    Follow-up: Continue to attend groups to address mental health symptom(s)     Discharge Criteria Planning: Alleviation of mental health symptoms.     Encounter performed and documented by Petty Storm MA, [facility]

## 2021-06-12 NOTE — PROGRESS NOTES
OUTPATIENT PROGRESS NOTE      Date of visit September 7, 2017    Beninese name  Waylon Murdock  Mother's name Ilene Nichole    Reasons For Visit Are Multiple Today:   1.  Culturally sensitive follow-up for mental health issues  2.  Involuntary rocking  movements of the torso dyskinesia, stable, and restlessness, improved  3.  Follow-up for schizoaffective disorder, no symptoms of psychosis at this time  4.  Follow-up for david, no symptoms of david at this time  5.  High risk medication use: involuntary movements are likely related to Prolixin  6.  Neurology consultation: 3 pages of encounter were reviewed,Diagnosis of akathisia, acetylcysteine 600 mg was started   7.  Beninese cultural issues: The interview is conducted in Beninese language  8.  Education on current medication regimen  9.  Education on future follow-up appointments  10.  Ego activities: The patient is active in [employer], singing in the [employer] choir  11. High risk medication use assessment of movements, discus  score 3, movements of shoulder and hip torsion, but I did not observe this movements, I noted only rocking movements and restlessness, which is related to akathisia.  So score is 0 in my opinion  12.  Question about medications: The patient would like to decrease the dose, but I am strongly against it, as his exacerbations tend to be quite severe, resulting in prolonged hospitalizations, last hospitalization lasted about 6 months due to severe psychosis and david, and limited response to various medications, so recommend him to continue the same dose for now    History of present illness/Subjective:  The patient is Beninese speaking. The interview is conducted in Beninese language, translated personally by me into English records which adds additional element of complexity to this visit and my assessment. multiple questions and issues were addressed as reflected above.  Treatment plan was discussed in details.  patient is calm, cooperative. No  behavioral or stated evidence of   excessive activation.  No behavioral or stated evidence of symptoms of david or psychosis or other mood symptoms /behaviors.  Denying SI/HI/voices/visions/telepathic communication/imaginary girlfriends/thoughts of cutting his thumb off.  Appears to be at baseline.  Compliant.  Appropriately engaging for his level of function.  Smiling appropriately.  He is concerned with weight gain, and asking me if it is related to medications, I explained to him that unlikely, as Prolixin tends to be weight neutral.    Medications:      Current Outpatient Prescriptions   Medication Sig Dispense Refill     CYANOCOBALAMIN, VITAMIN B-12, (VITAMIN B12 ORAL) Take by mouth daily.       fluPHENAZine (PROLIXIN) 5 MG tablet Take 5 mg by mouth 2 (two) times a day.       propranolol (INDERAL) 10 MG tablet Take 10 mg by mouth 2 (two) times a day as needed.       acetylcysteine (NAC) 600 mg cap capsule Take 600 mg by mouth daily.       No current facility-administered medications for this visit.          Family history/Social history   Lives with his parents.  Unemployed on SSI.  Volunteers at [employer]          Procedures:  1. Coordination of care: nursing notes, vital signs, multiple records of communication between the patient and the clinic, communication with the pharmacy, and multiple medication orders were reviewed signed and discussed with the patient. Multiple chart entries were reviewed in preparation of documentation and generation of documentation.  2.  Education: was provided on diagnosis, medication regimen, psychotherapeutic treatment modalities, future follow-up appointments.  3.  Counseling: was provided on coping with mental illness.  4.  Collateral information was obtained from  mother  5.  Coordination of care: I  entered all orders and educated the patient as above  6.  Coordination of care: I personally coordinated all medication orders, follow up orders, pharmacy requests, and provided  "the patient with detailed instructions  in his native South African language  7. South African cultural and immigration issues were addressed, the interview was conducted in South African language, South African medications were discussed as it is common in South African speaking community to take Russian produced medications which could be dangerous. The patient takes  none at this time      Review Of Systems:  As above.   Hypersomnia with melatonin.  The reminder of 10 systems was negative.      Vital Signs:    /86 (Patient Site: Left Arm, Patient Position: Sitting, Cuff Size: Adult Large)  Pulse (!) 103  Temp 98.3  F (36.8  C) (Oral)   Resp 14  Ht 5' 6\" (1.676 m)  Wt (!) 249 lb (112.9 kg)  BMI 40.19 kg/m2    Mental Status Examination:     Appearance  calm    Alert and oriented ×3    Attention and concentration  normal    Speech  fluent    Mood  good    Affect  appropriate    Thought processing  logical    Associations  normal    Thought content  no SI/HI/psychosis    Language  normal in South African    Short term memory  normal    Long term memory  normal    Fund of knowledge  normal    Psychomotor activity  normal    Gait and station  normal    Insight and judgment  normal        Limited Physical Examination:  Was performed  due to restlessness and akathisia related to Prolixin.  Rocking body movements.  Adequately nourished.  Overweight.      Laboratory Data:    personally reviewed.   No visits with results within 2 Month(s) from this visit.  Latest known visit with results is:    Admission on 07/16/2016, Discharged on 08/18/2016   Component Date Value     Bilirubin, Total 07/27/2016 0.4      Bilirubin, Direct 07/27/2016 0.1      Protein, Total 07/27/2016 7.6      Albumin 07/27/2016 3.9      Alkaline Phosphatase 07/27/2016 91      AST 07/27/2016 20      ALT 07/27/2016 48*     WBC 07/27/2016 7.8      RBC 07/27/2016 5.11      Hemoglobin 07/27/2016 14.7      Hematocrit 07/27/2016 44.3      MCV 07/27/2016 87      MCH 07/27/2016 28.8      [facility] 07/27[facility] 33.2      RDW 07/27/2016 12.5      Platelets 07/27/2016 291      MPV 07/27/2016 10.1      Neutrophils % 07/27/2016 63      Lymphocytes % 07/27/2016 29      Monocytes % 07/27/2016 6      Eosinophils % 07/27/2016 2      Basophils % 07/27/2016 1      Neutrophils Absolute 07/27/2016 4.9      Lymphocytes Absolute 07/27/2016 2.3      Monocytes Absolute 07/27/2016 0.5      Eosinophils Absolute 07/27/2016 0.1      Basophils Absolute 07/27/2016 0.0          Diagnosis:  Past Medical History:   Diagnosis Date     Bipolar I disorder      Calcified granuloma of lung 2014     Obesity (BMI 35.0-39.9 without comorbidity)      Paranoid schizophrenia      Vitamin B 12 deficiency        Patient Active Problem List   Diagnosis     Mood disorder in conditions classified elsewhere     Left arm pain     Dandruff     Other dysfunctions of sleep stages or arousal from sleep     Weight gain     Back ache     Clinical depression     Chronic paranoid schizophrenia     Neuroleptic consent form discussed and signed: July 17, 2016           Schizoaffective disorder, chronic    Akathisia  High risk medication use        Treatment Plan:  1. The patient was provided with education and detailed written and verbal instructions in native language on diagnosis, future follow-up, and treatment plan, same instructions were also provided in English language.   2. Instructed to return to clinic in  3-4 months or sooner if needed.  3. Nurse only clinic is available in the interim if needed.  4. Crisis plan in place.  5. Referral to a culturally sensitive Russian speaking therapist Dr. Albarran was provided.    6.  Continue Prolixin 5 mg twice daily  7. Close monitoring for side effects          Complex visit, multiple issues were addressed as reflected above, total of 10 issues, coordination of care, education, counseling, Syrian cultural issues, review of pertinent consultation and clinic records and multiple chart and Epic  entries in  preparation and generation of pertinent documentation, generation of multiple Epic entries and visit related orders, other details are fully reflected in the procedures paragraph. Please see associated nursing records for other details.         This note was created by undersigned using a Dragon dictation system. All typing errors or contextual distortion are unintentional and software inherent.     Sue Johnson MD

## 2021-06-12 NOTE — PROGRESS NOTES
Correct pharmacy verified with patient and confirmed in snapshot? [x] yes []no    Medications Phoned  to Pharmacy [] yes [x]no  Name of Pharmacist:  List Medications, including dose, quantity and instructions      Medication Prescriptions given to patient   [] yes  [x] no   List the name of the drug the prescription was written for.      Medications ordered this visit were e-scribed.  Verified by order class [] yes  [x] no      Medication changes or discontinuations were communicated to patient's pharmacy:  [] yes  [x] no  Pharmacist Spoke With:     UA collected [] yes  [x] no    [place] Prescription Monitoring Program Reviewed? [x] yes  [] no    Referrals/Labs were made to:     Completed Charge Capture?  [x] yes  [] no    Future appointment was made: [x] yes  [] no  1/9/18  Dictation completed at time of chart check: [x] yes  [] no    I have checked the documentation for today s encounters and the above information has been reviewed and completed.

## 2021-06-12 NOTE — PROGRESS NOTES
Mental Health Processing Group    Date of Service: 09/6/2017 Start Time: 2:00 Stop Time: 3:00 Total Time: 60 minutes  Frequency: Wednesdays    CPT: 78773    Care Provider: Petty Storm MA, [facility]  N=  6    [employer] interpretor was used for this session.     Subjective: The patient was seen today for a 60 minute group psychotherapy session held at [facility].     Mental Status Exam:    Patient was pleasant and cooperative. The patient was oriented X4. The patient made good eye contact and was well dressed with good grooming and hygiene. Recent and remote memories were intact. Concentration and focus was normal. Speech (tone, volume, and rate) were intact. Mood and affect were congruently anxious but appropriate for the content of the session. Fund of knowledge was normal. Thought process was mostly logical and linear. Insight and judgment were intact. Patient denied SI, plan and/or means. Pt continues to be motivated for treatment.     Objective:    Patient was able to participate and benefit from treatment as evidenced by the patient s verbal expression and understanding of idea discussed.     New Symptoms or Complaints: The patient did not disclose any new symptoms to the undersigned.     Reason Patient is participating in the group: The group session was necessary for the care of the patient to address how symptoms impact the patient s mental health, relationships and overall well-being.      Patient's response to current intervention: Patient was receptive of feedback given, supportive of other group members and appears to have benefited from the group process. No barriers to learning evidenced.      Progress toward short-term goals: The patient completed the assigned homework which was to assess maladaptive thinking and its connections to the patient s pervious and/or current behaviors.     Patient's Impressions: The patient indicated readiness to learn by the patient s choice to attend  group.     Review of long term goals: See treatment plan developed by outpatient counselor.    Are there any new goals: There were no new goals brought to the undersigned s attention.      The patient completed today s group session for the processing of Mental Health Symptoms and its relationship to the patient s Chemical Dependency. Group content and interventions performed in this session: Check-in, the connection between thoughts, feelings, and behaviors. Group members also talked about ways that sleep affect mental health and daily struggles with mental health.     Patient education on the above topics was provided during this session. The patient was given an opportunity to ask questions and participate in the group discussion. The patient exhibited individual understanding by asking relevant questions and making appropriate comments to other group members.    Patient indicated upon onset of group that his anxiety was a 2 out of 10. Patient reported he felt good.    Assessment:  1. Paranoid schizophrenia, chronic condition with acute exacerbation      No indication of SI, plan and/or means.     Providers Impression of Current Status:   Patient participated actively in today's group therapy session and appears to have learned the impact of one s thinking on feelings and behaviors. Patient will continue to benefit from ongoing psychotherapy that uses CBT and MI.     Psychotherapeutic Techniques: A cognitive behavioral modality was used.     PLAN:    Follow-up: Continue to attend groups to address mental health symptom(s)     Discharge Criteria Planning: Alleviation of mental health symptoms.     Encounter performed and documented by Petty Storm MA, [facility]

## 2021-06-12 NOTE — PROGRESS NOTES
Mental Health Processing Group    Date of Service: 08/23/2017 Start Time: 2:00 Stop Time: 3:00 Total Time: 60 minutes  Frequency: Wednesdays    CPT: [id number]    Care Provider: Petty Storm MA, [facility]  N=  6    [employer] interpretor was used for this session.     Subjective: The patient was seen today for a 60 minute group psychotherapy session held at [facility].     Mental Status Exam:    Patient was pleasant and cooperative. The patient was oriented X4. The patient made good eye contact and was well dressed with good grooming and hygiene. Recent and remote memories were intact. Concentration and focus was normal. Speech (tone, volume, and rate) were intact. Mood and affect were congruently anxious but appropriate for the content of the session. Fund of knowledge was normal. Thought process was mostly logical and linear. Insight and judgment were intact. Patient denied SI, plan and/or means. Pt continues to be motivated for treatment.     Objective:    Patient was able to participate and benefit from treatment as evidenced by the patient s verbal expression and understanding of idea discussed.     New Symptoms or Complaints: The patient did not disclose any new symptoms to the undersigned.     Reason Patient is participating in the group: The group session was necessary for the care of the patient to address how symptoms impact the patient s mental health, relationships and overall well-being.      Patient's response to current intervention: Patient was receptive of feedback given, supportive of other group members and appears to have benefited from the group process. No barriers to learning evidenced.      Progress toward short-term goals: The patient completed the assigned homework which was to assess maladaptive thinking and its connections to the patient s pervious and/or current behaviors.     Patient's Impressions: The patient indicated readiness to learn by the patient s choice to attend  group.     Review of long term goals: See treatment plan developed by outpatient counselor.    Are there any new goals: There were no new goals brought to the undersigned s attention.      The patient completed today s group session for the processing of Mental Health Symptoms and its relationship to the patient s Chemical Dependency. Group content and interventions performed in this session: Check-in, the connection between thoughts, feelings, and behaviors. Group members also talked about struggles with self-care and managing mental health symptoms at times.    Patient education on the above topics was provided during this session. The patient was given an opportunity to ask questions and participate in the group discussion. The patient exhibited individual understanding by asking relevant questions and making appropriate comments to other group members.    Patient indicated upon onset of group that his anxiety was a 3 out of 10. Patient reported he felt okay.    Assessment:  1. Paranoid schizophrenia, chronic condition with acute exacerbation      No indication of SI, plan and/or means.     Providers Impression of Current Status:   Patient participated actively in today's group therapy session and appears to have learned the impact of one s thinking on feelings and behaviors. Patient will continue to benefit from ongoing psychotherapy that uses CBT and MI.     Psychotherapeutic Techniques: A cognitive behavioral modality was used.     PLAN:    Follow-up: Continue to attend groups to address mental health symptom(s)     Discharge Criteria Planning: Alleviation of mental health symptoms.     Encounter performed and documented by Petty Storm MA, [facility]

## 2021-06-12 NOTE — PROGRESS NOTES
Mental Health Processing Group    Date of Service: 08/16/2017 Start Time: 2:00 Stop Time: 3:00 Total Time: 60 minutes  Frequency: Wednesdays    CPT: 42311    Care Provider: Petty Storm MA, [facility]  N=  5    Subjective: The patient was seen today for a 60 minute group psychotherapy session held at [facility].     Mental Status Exam:    Patient was pleasant and cooperative. The patient was oriented X4. The patient made good eye contact and was well dressed with good grooming and hygiene. Recent and remote memories were intact. Concentration and focus was normal. Speech (tone, volume, and rate) were intact. Mood and affect were congruently anxious but appropriate for the content of the session. Fund of knowledge was normal. Thought process was mostly logical and linear. Insight and judgment were intact. Patient denied SI, plan and/or means. Pt continues to be motivated for treatment.     Objective:    Patient was able to participate and benefit from treatment as evidenced by the patient s verbal expression and understanding of idea discussed.     New Symptoms or Complaints: The patient did not disclose any new symptoms to the undersigned.     Reason Patient is participating in the group: The group session was necessary for the care of the patient to address how symptoms impact the patient s mental health, relationships and overall well-being.      Patient's response to current intervention: Patient was receptive of feedback given, supportive of other group members and appears to have benefited from the group process. No barriers to learning evidenced.      Progress toward short-term goals: The patient completed the assigned homework which was to assess maladaptive thinking and its connections to the patient s pervious and/or current behaviors.     Patient's Impressions: The patient indicated readiness to learn by the patient s choice to attend group.     Review of long term goals: See treatment  plan developed by outpatient counselor.    Are there any new goals: There were no new goals brought to the undersigned s attention.      The patient completed today s group session for the processing of Mental Health Symptoms and its relationship to the patient s Chemical Dependency. Group content and interventions performed in this session: Check-in, the connection between thoughts, feelings, and behaviors. Group members also talked about staying on task, focusing on individual needs and advocating for self.     Patient education on the above topics was provided during this session. The patient was given an opportunity to ask questions and participate in the group discussion. The patient exhibited individual understanding by asking relevant questions and making appropriate comments to other group members.    Patient indicated upon onset of group that his anxiety was a 2 out of 10. Patient reported he felt good.    Assessment:  1. Paranoid schizophrenia, chronic condition with acute exacerbation      No indication of SI, plan and/or means.     Providers Impression of Current Status:   Patient participated actively in today's group therapy session and appears to have learned the impact of one s thinking on feelings and behaviors. Patient will continue to benefit from ongoing psychotherapy that uses CBT and MI.     Psychotherapeutic Techniques: A cognitive behavioral modality was used.     PLAN:    Follow-up: Continue to attend groups to address mental health symptom(s)     Discharge Criteria Planning: Alleviation of mental health symptoms.     Encounter performed and documented by Petty Storm MA, [facility]

## 2021-06-12 NOTE — PROGRESS NOTES
Mental Health Processing Group    Date of Service: 08/23/2017 Start Time: 2:00 Stop Time: 3:00 Total Time: 60 minutes  Frequency: Wednesdays    CPT: [id number]    Care Provider: Petty Storm MA, [facility]  N=  4    [employer] interpretor was used for this session.     Subjective: The patient was seen today for a 60 minute group psychotherapy session held at [facility].     Mental Status Exam:    Patient was pleasant and cooperative. The patient was oriented X4. The patient made good eye contact and was well dressed with good grooming and hygiene. Recent and remote memories were intact. Concentration and focus was normal. Speech (tone, volume, and rate) were intact. Mood and affect were congruently anxious but appropriate for the content of the session. Fund of knowledge was normal. Thought process was mostly logical and linear. Insight and judgment were intact. Patient denied SI, plan and/or means. Pt continues to be motivated for treatment.     Objective:    Patient was able to participate and benefit from treatment as evidenced by the patient s verbal expression and understanding of idea discussed.     New Symptoms or Complaints: The patient did not disclose any new symptoms to the undersigned.     Reason Patient is participating in the group: The group session was necessary for the care of the patient to address how symptoms impact the patient s mental health, relationships and overall well-being.      Patient's response to current intervention: Patient was receptive of feedback given, supportive of other group members and appears to have benefited from the group process. No barriers to learning evidenced.      Progress toward short-term goals: The patient completed the assigned homework which was to assess maladaptive thinking and its connections to the patient s pervious and/or current behaviors.     Patient's Impressions: The patient indicated readiness to learn by the patient s choice to attend  group.     Review of long term goals: See treatment plan developed by outpatient counselor.    Are there any new goals: There were no new goals brought to the undersigned s attention.      The patient completed today s group session for the processing of Mental Health Symptoms and its relationship to the patient s Chemical Dependency. Group content and interventions performed in this session: Check-in, the connection between thoughts, feelings, and behaviors. Group members also talked about coping skills to help handle anger and external stressors.     Patient education on the above topics was provided during this session. The patient was given an opportunity to ask questions and participate in the group discussion. The patient exhibited individual understanding by asking relevant questions and making appropriate comments to other group members.    Patient indicated upon onset of group that his anxiety was a 2 out of 10. Patient reported he felt sad.    Assessment:  1. Paranoid schizophrenia, chronic condition with acute exacerbation      No indication of SI, plan and/or means.     Providers Impression of Current Status:   Patient participated actively in today's group therapy session and appears to have learned the impact of one s thinking on feelings and behaviors. Patient will continue to benefit from ongoing psychotherapy that uses CBT and MI.     Psychotherapeutic Techniques: A cognitive behavioral modality was used.     PLAN:    Follow-up: Continue to attend groups to address mental health symptom(s)     Discharge Criteria Planning: Alleviation of mental health symptoms.     Encounter performed and documented by Petty Storm MA, [facility]

## 2021-06-12 NOTE — PROGRESS NOTES
Mental Health Processing Group    Date of Service: 07/26/2017 Start Time: 2:00 Stop Time: 3:00 Total Time: 60 minutes  Frequency: Wednesdays    CPT: [id number]    Care Provider: Petty Storm MA, [facility]  N=  6    Subjective: The patient was seen today for a 60 minute group psychotherapy session held at [facility].     Mental Status Exam:    Patient was pleasant and cooperative. The patient was oriented X4. The patient made good eye contact and was well dressed with good grooming and hygiene. Recent and remote memories were intact. Concentration and focus was normal. Speech (tone, volume, and rate) were intact. Mood and affect were congruently anxious but appropriate for the content of the session. Fund of knowledge was normal. Thought process was mostly logical and linear. Insight and judgment were intact. Patient denied SI, plan and/or means. Pt continues to be motivated for treatment.     Objective:    Patient was able to participate and benefit from treatment as evidenced by the patient s verbal expression and understanding of idea discussed.     New Symptoms or Complaints: The patient did not disclose any new symptoms to the undersigned.     Reason Patient is participating in the group: The group session was necessary for the care of the patient to address how symptoms impact the patient s mental health, relationships and overall well-being.      Patient's response to current intervention: Patient was receptive of feedback given, supportive of other group members and appears to have benefited from the group process. No barriers to learning evidenced.      Progress toward short-term goals: The patient completed the assigned homework which was to assess maladaptive thinking and its connections to the patient s pervious and/or current behaviors.     Patient's Impressions: The patient indicated readiness to learn by the patient s choice to attend group.     Review of long term goals: See treatment  plan developed by outpatient counselor.    Are there any new goals: There were no new goals brought to the undersigned s attention.      The patient completed today s group session for the processing of Mental Health Symptoms and its relationship to the patient s Chemical Dependency. Group content and interventions performed in this session: Check-in, the connection between thoughts, feelings, and behaviors. Group members also talked about conflict resolution and assertive communication.     Patient education on the above topics was provided during this session. The patient was given an opportunity to ask questions and participate in the group discussion. The patient exhibited individual understanding by asking relevant questions and making appropriate comments to other group members.    Patient indicated upon onset of group that his anxiety was a 2/3 out of 10. Patient reported he felt okay.    Assessment:  1. Paranoid schizophrenia, chronic condition with acute exacerbation      No indication of SI, plan and/or means.     Providers Impression of Current Status:   Patient participated actively in today's group therapy session and appears to have learned the impact of one s thinking on feelings and behaviors. Patient will continue to benefit from ongoing psychotherapy that uses CBT and MI.     Psychotherapeutic Techniques: A cognitive behavioral modality was used.     PLAN:    Follow-up: Continue to attend groups to address mental health symptom(s)     Discharge Criteria Planning: Alleviation of mental health symptoms.     Encounter performed and [facility], [facility]

## 2021-06-13 NOTE — PROGRESS NOTES
Mental Health Processing Group    Date of Service: 10/11/2017 Start Time: 2:00 Stop Time: 3:00 Total Time: 60 minutes  Frequency: Wednesdays    CPT: [id number]    Care Provider: Petty Storm MA, [facility]  N=  3    [employer] interpretor was used for this session.     Subjective: The patient was seen today for a 60 minute group psychotherapy session held at [facility].     Mental Status Exam:    Patient was pleasant and cooperative. The patient was oriented X4. The patient made good eye contact and was well dressed with good grooming and hygiene. Recent and remote memories were intact. Concentration and focus was normal. Speech (tone, volume, and rate) were intact. Mood and affect were congruently anxious but appropriate for the content of the session. Fund of knowledge was normal. Thought process was mostly logical and linear. Insight and judgment were intact. Patient denied SI, plan and/or means. Pt continues to be motivated for treatment.     Objective:    Patient was able to participate and benefit from treatment as evidenced by the patient s verbal expression and understanding of idea discussed.     New Symptoms or Complaints: The patient did not disclose any new symptoms to the undersigned.     Reason Patient is participating in the group: The group session was necessary for the care of the patient to address how symptoms impact the patient s mental health, relationships and overall well-being.      Patient's response to current intervention: Patient was receptive of feedback given, supportive of other group members and appears to have benefited from the group process. No barriers to learning evidenced.      Progress toward short-term goals: The patient completed the assigned homework which was to assess maladaptive thinking and its connections to the patient s pervious and/or current behaviors.     Patient's Impressions: The patient indicated readiness to learn by the patient s choice to attend  group.     Review of long term goals: See treatment plan developed by outpatient counselor.    Are there any new goals: There were no new goals brought to the undersigned s attention.      The patient completed today s group session for the processing of Mental Health Symptoms and its relationship to the patient s Chemical Dependency. Group content and interventions performed in this session: Check-in, the connection between thoughts, feelings, and behaviors. Group members also talked about struggling with situation out of their control and handling stress positively.     Patient education on the above topics was provided during this session. The patient was given an opportunity to ask questions and participate in the group discussion. The patient exhibited individual understanding by asking relevant questions and making appropriate comments to other group members.    Patient indicated upon onset of group that his anxiety was a 3 out of 10. Patient reported he okay.    Assessment:  1. Paranoid schizophrenia, chronic condition with acute exacerbation      No indication of SI, plan and/or means.     Providers Impression of Current Status:   Patient participated actively in today's group therapy session and appears to have learned the impact of one s thinking on feelings and behaviors. Patient will continue to benefit from ongoing psychotherapy that uses CBT and MI.     Psychotherapeutic Techniques: A cognitive behavioral modality was used.     PLAN:    Follow-up: Continue to attend groups to address mental health symptom(s)     Discharge Criteria Planning: Alleviation of mental health symptoms.     Encounter performed and documented by Petty Storm MA, [facility]

## 2021-06-13 NOTE — PROGRESS NOTES
Mental Health Processing Group    Date of Service: 09/13/2017 Start Time: 2:00 Stop Time: 3:00 Total Time: 60 minutes  Frequency: Wednesdays    CPT: [id number]    Care Provider: Petty Storm MA, [facility]  N=  4    [employer] interpretor was used for this session.     Subjective: The patient was seen today for a 60 minute group psychotherapy session held at [facility].     Mental Status Exam:    Patient was pleasant and cooperative. The patient was oriented X4. The patient made good eye contact and was well dressed with good grooming and hygiene. Recent and remote memories were intact. Concentration and focus was normal. Speech (tone, volume, and rate) were intact. Mood and affect were congruently anxious but appropriate for the content of the session. Fund of knowledge was normal. Thought process was mostly logical and linear. Insight and judgment were intact. Patient denied SI, plan and/or means. Pt continues to be motivated for treatment.     Objective:    Patient was able to participate and benefit from treatment as evidenced by the patient s verbal expression and understanding of idea discussed.     New Symptoms or Complaints: The patient did not disclose any new symptoms to the undersigned.     Reason Patient is participating in the group: The group session was necessary for the care of the patient to address how symptoms impact the patient s mental health, relationships and overall well-being.      Patient's response to current intervention: Patient was receptive of feedback given, supportive of other group members and appears to have benefited from the group process. No barriers to learning evidenced.      Progress toward short-term goals: The patient completed the assigned homework which was to assess maladaptive thinking and its connections to the patient s pervious and/or current behaviors.     Patient's Impressions: The patient indicated readiness to learn by the patient s choice to attend  group.     Review of long term goals: See treatment plan developed by outpatient counselor.    Are there any new goals: There were no new goals brought to the undersigned s attention.      The patient completed today s group session for the processing of Mental Health Symptoms and its relationship to the patient s Chemical Dependency. Group content and interventions performed in this session: Check-in, the connection between thoughts, feelings, and behaviors. Group members also talked about external stress and focusing on goals.     Patient education on the above topics was provided during this session. The patient was given an opportunity to ask questions and participate in the group discussion. The patient exhibited individual understanding by asking relevant questions and making appropriate comments to other group members.    Patient indicated upon onset of group that his anxiety was a 2 out of 10. Patient reported he felt good.    Assessment:  1. Paranoid schizophrenia, chronic condition with acute exacerbation      No indication of SI, plan and/or means.     Providers Impression of Current Status:   Patient participated actively in today's group therapy session and appears to have learned the impact of one s thinking on feelings and behaviors. Patient will continue to benefit from ongoing psychotherapy that uses CBT and MI.     Psychotherapeutic Techniques: A cognitive behavioral modality was used.     PLAN:    Follow-up: Continue to attend groups to address mental health symptom(s)     Discharge Criteria Planning: Alleviation of mental health symptoms.     Encounter performed and documented by Petty Storm MA, [facility]

## 2021-06-13 NOTE — PROGRESS NOTES
Mental Health Processing Group    Date of Service: 10/25/2017 Start Time: 2:00 Stop Time: 3:00 Total Time: 60 minutes  Frequency: Wednesdays    CPT: [id number]    Care Provider: Petty Storm MA, [facility]  N=  3    [employer] interpretor was used for this session.     Subjective: The patient was seen today for a 60 minute group psychotherapy session held at [facility].     Mental Status Exam:    Patient was pleasant and cooperative. The patient was oriented X4. The patient made good eye contact and was well dressed with good grooming and hygiene. Recent and remote memories were intact. Concentration and focus was normal. Speech (tone, volume, and rate) were intact. Mood and affect were congruently anxious but appropriate for the content of the session. Fund of knowledge was normal. Thought process was mostly logical and linear. Insight and judgment were intact. Patient denied SI, plan and/or means. Pt continues to be motivated for treatment.     Objective:    Patient was able to participate and benefit from treatment as evidenced by the patient s verbal expression and understanding of idea discussed.     New Symptoms or Complaints: The patient did not disclose any new symptoms to the undersigned.     Reason Patient is participating in the group: The group session was necessary for the care of the patient to address how symptoms impact the patient s mental health, relationships and overall well-being.      Patient's response to current intervention: Patient was receptive of feedback given, supportive of other group members and appears to have benefited from the group process. No barriers to learning evidenced.      Progress toward short-term goals: The patient completed the assigned homework which was to assess maladaptive thinking and its connections to the patient s pervious and/or current behaviors.     Patient's Impressions: The patient indicated readiness to learn by the patient s choice to attend  group.     Review of long term goals: See treatment plan developed by outpatient counselor.    Are there any new goals: There were no new goals brought to the undersigned s attention.      The patient completed today s group session for the processing of Mental Health Symptoms and its relationship to the patient s Chemical Dependency. Group content and interventions performed in this session: Check-in, the connection between thoughts, feelings, and behaviors. Group members talked about staying positive and focusing on personal needs.     Patient education on the above topics was provided during this session. The patient was given an opportunity to ask questions and participate in the group discussion. The patient exhibited individual understanding by asking relevant questions and making appropriate comments to other group members.    Patient indicated upon onset of group that his anxiety was a 3 out of 10. Patient reported he okay.    Assessment:  1. Paranoid schizophrenia, chronic condition with acute exacerbation      No indication of SI, plan and/or means.     Providers Impression of Current Status:   Patient participated actively in today's group therapy session and appears to have learned the impact of one s thinking on feelings and behaviors. Patient will continue to benefit from ongoing psychotherapy that uses CBT and MI.     Psychotherapeutic Techniques: A cognitive behavioral modality was used.     PLAN:    Follow-up: Continue to attend groups to address mental health symptom(s)     Discharge Criteria Planning: Alleviation of mental health symptoms.     Encounter performed and documented by Petty Storm MA, [facility]

## 2021-06-13 NOTE — TELEPHONE ENCOUNTER
Dialed his cell number and left VM asking to contact us re: scheduling appt with Dr. Johnson    Dialed home number, spoke to his mother, and updated that Dr. Johnson will be out until end of January and offered to schedule earlier appointment with another mental health provider but she declined it for now stating that his mental health is stable. Advised to call the clinic right away with any concerns.  Next appt scheduled for 5/13/21    Mother said that it has been a while since he saw his PCP so pt is in need of prescription coverage in the clinic. He is getting injections at [facility] and  they are calling us for new orders to be faxed to [phone]   Pt put in on wait list for Dr. Johnson

## 2021-06-13 NOTE — PROGRESS NOTES
Mental Health Processing Group    Date of Service: 11/1/2017 Start Time: 2:00 Stop Time: 3:00 Total Time: 60 minutes  Frequency: Wednesdays    CPT: [id number]    Care Provider: Petty Storm MA, [facility]  N=  2    [employer] interpretor was used for this session.     Subjective: The patient was seen today for a 60 minute group psychotherapy session held at [facility].     Mental Status Exam:    Patient was pleasant and cooperative. The patient was oriented X4. The patient made good eye contact and was well dressed with good grooming and hygiene. Recent and remote memories were intact. Concentration and focus was normal. Speech (tone, volume, and rate) were intact. Mood and affect were congruently anxious but appropriate for the content of the session. Fund of knowledge was normal. Thought process was mostly logical and linear. Insight and judgment were intact. Patient denied SI, plan and/or means. Pt continues to be motivated for treatment.     Objective:    Patient was able to participate and benefit from treatment as evidenced by the patient s verbal expression and understanding of idea discussed.     New Symptoms or Complaints: The patient did not disclose any new symptoms to the undersigned.     Reason Patient is participating in the group: The group session was necessary for the care of the patient to address how symptoms impact the patient s mental health, relationships and overall well-being.      Patient's response to current intervention: Patient was receptive of feedback given, supportive of other group members and appears to have benefited from the group process. No barriers to learning evidenced.      Progress toward short-term goals: The patient completed the assigned homework which was to assess maladaptive thinking and its connections to the patient s pervious and/or current behaviors.     Patient's Impressions: The patient indicated readiness to learn by the patient s choice to attend  group.     Review of long term goals: See treatment plan developed by outpatient counselor.    Are there any new goals: There were no new goals brought to the undersigned s attention.      The patient completed today s group session for the processing of Mental Health Symptoms and its relationship to the patient s Chemical Dependency. Group content and interventions performed in this session: Check-in, the connection between thoughts, feelings, and behaviors. Group members talked about importance of routines and find reta in day to day activities.     Patient education on the above topics was provided during this session. The patient was given an opportunity to ask questions and participate in the group discussion. The patient exhibited individual understanding by asking relevant questions and making appropriate comments to other group members.    Patient indicated upon onset of group that his anxiety was a 2 out of 10. Patient reported he fine.     Assessment:  1. Paranoid schizophrenia, chronic condition with acute exacerbation      No indication of SI, plan and/or means.     Providers Impression of Current Status:   Patient participated actively in today's group therapy session and appears to have learned the impact of one s thinking on feelings and behaviors. Patient will continue to benefit from ongoing psychotherapy that uses CBT and MI.     Psychotherapeutic Techniques: A cognitive behavioral modality was used.     PLAN:    Follow-up: Continue to attend groups to address mental health symptom(s)     Discharge Criteria Planning: Alleviation of mental health symptoms.     Encounter performed and documented by Petty Storm MA, [facility]

## 2021-06-13 NOTE — PROGRESS NOTES
Mental Health Processing Group    Date of Service: 09/20/2017 Start Time: 2:00 Stop Time: 3:00 Total Time: 60 minutes  Frequency: Wednesdays    CPT: [id number]    Care Provider: Petty Storm MA, [facility]  N=  5    [employer] interpretor was used for this session.     Subjective: The patient was seen today for a 60 minute group psychotherapy session held at [facility].     Mental Status Exam:    Patient was pleasant and cooperative. The patient was oriented X4. The patient made good eye contact and was well dressed with good grooming and hygiene. Recent and remote memories were intact. Concentration and focus was normal. Speech (tone, volume, and rate) were intact. Mood and affect were congruently anxious but appropriate for the content of the session. Fund of knowledge was normal. Thought process was mostly logical and linear. Insight and judgment were intact. Patient denied SI, plan and/or means. Pt continues to be motivated for treatment.     Objective:    Patient was able to participate and benefit from treatment as evidenced by the patient s verbal expression and understanding of idea discussed.     New Symptoms or Complaints: The patient did not disclose any new symptoms to the undersigned.     Reason Patient is participating in the group: The group session was necessary for the care of the patient to address how symptoms impact the patient s mental health, relationships and overall well-being.      Patient's response to current intervention: Patient was receptive of feedback given, supportive of other group members and appears to have benefited from the group process. No barriers to learning evidenced.      Progress toward short-term goals: The patient completed the assigned homework which was to assess maladaptive thinking and its connections to the patient s pervious and/or current behaviors.     Patient's Impressions: The patient indicated readiness to learn by the patient s choice to attend  group.     Review of long term goals: See treatment plan developed by outpatient counselor.    Are there any new goals: There were no new goals brought to the undersigned s attention.      The patient completed today s group session for the processing of Mental Health Symptoms and its relationship to the patient s Chemical Dependency. Group content and interventions performed in this session: Check-in, the connection between thoughts, feelings, and behaviors. Group members also talked about understanding personal symptoms of mental health and ways to handle stress.     Patient education on the above topics was provided during this session. The patient was given an opportunity to ask questions and participate in the group discussion. The patient exhibited individual understanding by asking relevant questions and making appropriate comments to other group members.    Patient indicated upon onset of group that his anxiety was a 2 out of 10. Patient reported he felt good.    Assessment:  1. Paranoid schizophrenia, chronic condition with acute exacerbation      No indication of SI, plan and/or means.     Providers Impression of Current Status:   Patient participated actively in today's group therapy session and appears to have learned the impact of one s thinking on feelings and behaviors. Patient will continue to benefit from ongoing psychotherapy that uses CBT and MI.     Psychotherapeutic Techniques: A cognitive behavioral modality was used.     PLAN:    Follow-up: Continue to attend groups to address mental health symptom(s)     Discharge Criteria Planning: Alleviation of mental health symptoms.     Encounter performed and documented by Petty Storm MA, [facility]

## 2021-06-13 NOTE — PROGRESS NOTES
Mental Health Processing Group    Date of Service: 10/04/2017 Start Time: 2:00 Stop Time: 3:00 Total Time: 60 minutes  Frequency: Wednesdays    CPT: [id number]    Care Provider: Petty Storm MA, [facility]  N=  4    Subjective: The patient was seen today for a 60 minute group psychotherapy session held at [facility].     Mental Status Exam:    Patient was pleasant and cooperative. The patient was oriented X4. The patient made good eye contact and was well dressed with good grooming and hygiene. Recent and remote memories were intact. Concentration and focus was normal. Speech (tone, volume, and rate) were intact. Mood and affect were congruently anxious but appropriate for the content of the session. Fund of knowledge was normal. Thought process was mostly logical and linear. Insight and judgment were intact. Patient denied SI, plan and/or means. Pt continues to be motivated for treatment.     Objective:    Patient was able to participate and benefit from treatment as evidenced by the patient s verbal expression and understanding of idea discussed.     New Symptoms or Complaints: The patient did not disclose any new symptoms to the undersigned.     Reason Patient is participating in the group: The group session was necessary for the care of the patient to address how symptoms impact the patient s mental health, relationships and overall well-being.      Patient's response to current intervention: Patient was receptive of feedback given, supportive of other group members and appears to have benefited from the group process. No barriers to learning evidenced.      Progress toward short-term goals: The patient completed the assigned homework which was to assess maladaptive thinking and its connections to the patient s pervious and/or current behaviors.     Patient's Impressions: The patient indicated readiness to learn by the patient s choice to attend group.     Review of long term goals: See treatment  plan developed by outpatient counselor.    Are there any new goals: There were no new goals brought to the undersigned s attention.      The patient completed today s group session for the processing of Mental Health Symptoms and its relationship to the patient s Chemical Dependency. Group content and interventions performed in this session: Check-in, the connection between thoughts, feelings, and behaviors. Group members also talked about housing concerns and working towards positive goals.     Patient education on the above topics was provided during this session. The patient was given an opportunity to ask questions and participate in the group discussion. The patient exhibited individual understanding by asking relevant questions and making appropriate comments to other group members.    Patient indicated upon onset of group that his anxiety was a 2 out of 10. Patient reported he felt good.    Assessment:  1. Paranoid schizophrenia, chronic condition with acute exacerbation      No indication of SI, plan and/or means.     Providers Impression of Current Status:   Patient participated actively in today's group therapy session and appears to have learned the impact of one s thinking on feelings and behaviors. Patient will continue to benefit from ongoing psychotherapy that uses CBT and MI.     Psychotherapeutic Techniques: A cognitive behavioral modality was used.     PLAN:    Follow-up: Continue to attend groups to address mental health symptom(s)     Discharge Criteria Planning: Alleviation of mental health symptoms.     Encounter performed and documented by Petty Storm MA, [facility]

## 2021-06-13 NOTE — PROGRESS NOTES
Mental Health Processing Group    Date of Service: 10/18/2017 Start Time: 2:00 Stop Time: 3:00 Total Time: 60 minutes  Frequency: Wednesdays    CPT: [id number]    Care Provider: Petty Storm MA, [facility]  N=  3    [employer] interpretor was used for this session.     Subjective: The patient was seen today for a 60 minute group psychotherapy session held at [facility].     Mental Status Exam:    Patient was pleasant and cooperative. The patient was oriented X4. The patient made good eye contact and was well dressed with good grooming and hygiene. Recent and remote memories were intact. Concentration and focus was normal. Speech (tone, volume, and rate) were intact. Mood and affect were congruently anxious but appropriate for the content of the session. Fund of knowledge was normal. Thought process was mostly logical and linear. Insight and judgment were intact. Patient denied SI, plan and/or means. Pt continues to be motivated for treatment.     Objective:    Patient was able to participate and benefit from treatment as evidenced by the patient s verbal expression and understanding of idea discussed.     New Symptoms or Complaints: The patient did not disclose any new symptoms to the undersigned.     Reason Patient is participating in the group: The group session was necessary for the care of the patient to address how symptoms impact the patient s mental health, relationships and overall well-being.      Patient's response to current intervention: Patient was receptive of feedback given, supportive of other group members and appears to have benefited from the group process. No barriers to learning evidenced.      Progress toward short-term goals: The patient completed the assigned homework which was to assess maladaptive thinking and its connections to the patient s pervious and/or current behaviors.     Patient's Impressions: The patient indicated readiness to learn by the patient s choice to attend  group.     Review of long term goals: See treatment plan developed by outpatient counselor.    Are there any new goals: There were no new goals brought to the undersigned s attention.      The patient completed today s group session for the processing of Mental Health Symptoms and its relationship to the patient s Chemical Dependency. Group content and interventions performed in this session: Check-in, the connection between thoughts, feelings, and behaviors. Group members talked about handling stressful situations and taking control over life.     Patient education on the above topics was provided during this session. The patient was given an opportunity to ask questions and participate in the group discussion. The patient exhibited individual understanding by asking relevant questions and making appropriate comments to other group members.    Patient indicated upon onset of group that his anxiety was a 3 out of 10. Patient reported he okay.    Assessment:  1. Paranoid schizophrenia, chronic condition with acute exacerbation      No indication of SI, plan and/or means.     Providers Impression of Current Status:   Patient participated actively in today's group therapy session and appears to have learned the impact of one s thinking on feelings and behaviors. Patient will continue to benefit from ongoing psychotherapy that uses CBT and MI.     Psychotherapeutic Techniques: A cognitive behavioral modality was used.     PLAN:    Follow-up: Continue to attend groups to address mental health symptom(s)     Discharge Criteria Planning: Alleviation of mental health symptoms.     Encounter performed and documented by Petty Storm MA, [facility]

## 2021-06-14 NOTE — PROGRESS NOTES
Mental Health Processing Group    Date of Service: 11/22/2017 Start Time: 2:00 Stop Time: 3:00 Total Time: 60 minutes  Frequency: Wednesdays    CPT: [id number]    Care Provider: Petty Storm MA, [facility]  N=  4    [employer] interpretor was used for this session.     Subjective: The patient was seen today for a 60 minute group psychotherapy session held at [facility].     Mental Status Exam:    Patient was pleasant and cooperative. The patient was oriented X4. The patient made good eye contact and was well dressed with good grooming and hygiene. Recent and remote memories were intact. Concentration and focus was normal. Speech (tone, volume, and rate) were intact. Mood and affect were congruently anxious but appropriate for the content of the session. Fund of knowledge was normal. Thought process was mostly logical and linear. Insight and judgment were intact. Patient denied SI, plan and/or means. Pt continues to be motivated for treatment.     Objective:    Patient was able to participate and benefit from treatment as evidenced by the patient s verbal expression and understanding of idea discussed.     New Symptoms or Complaints: The patient did not disclose any new symptoms to the undersigned.     Reason Patient is participating in the group: The group session was necessary for the care of the patient to address how symptoms impact the patient s mental health, relationships and overall well-being.      Patient's response to current intervention: Patient was receptive of feedback given, supportive of other group members and appears to have benefited from the group process. No barriers to learning evidenced.      Progress toward short-term goals: The patient completed the assigned homework which was to assess maladaptive thinking and its connections to the patient s pervious and/or current behaviors.     Patient's Impressions: The patient indicated readiness to learn by the patient s choice to attend  group.     Review of long term goals: See treatment plan developed by outpatient counselor.    Are there any new goals: There were no new goals brought to the undersigned s attention.      The patient completed today s group session for the processing of Mental Health Symptoms and its relationship to the patient s Chemical Dependency. Group content and interventions performed in this session: Check-in, the connection between thoughts, feelings, and behaviors. Group members talked about stressors with holiday's and family.     Patient education on the above topics was provided during this session. The patient was given an opportunity to ask questions and participate in the group discussion. The patient exhibited individual understanding by asking relevant questions and making appropriate comments to other group members.    Patient indicated upon onset of group that his anxiety was a 2 out of 10. Patient reported he felt okay.    Assessment:  1. Paranoid schizophrenia, chronic condition with acute exacerbation      No indication of SI, plan and/or means.     Providers Impression of Current Status:   Patient participated actively in today's group therapy session and appears to have learned the impact of one s thinking on feelings and behaviors. Patient will continue to benefit from ongoing psychotherapy that uses CBT and MI.     Psychotherapeutic Techniques: A cognitive behavioral modality was used.     PLAN:    Follow-up: Continue to attend groups to address mental health symptom(s)     Discharge Criteria Planning: Alleviation of mental health symptoms.     Encounter performed and documented by Petty Storm MA, [facility]

## 2021-06-14 NOTE — PROGRESS NOTES
Mental Health Processing Group    Date of Service: 12/20/2017 Start Time: 2:00 Stop Time: 3:00 Total Time: 60 minutes  Frequency: Wednesdays    CPT: 25385    Care Provider: Petty Storm MA, [facility]  N=  4    [employer] interpretor was used for this session.     Subjective: The patient was seen today for a 60 minute group psychotherapy session held at [facility].     Mental Status Exam:    Patient was pleasant and cooperative. The patient was oriented X4. The patient made good eye contact and was well dressed with good grooming and hygiene. Recent and remote memories were intact. Concentration and focus was normal. Speech (tone, volume, and rate) were intact. Mood and affect were congruently anxious but appropriate for the content of the session. Fund of knowledge was normal. Thought process was mostly logical and linear. Insight and judgment were intact. Patient denied SI, plan and/or means. Pt continues to be motivated for treatment.     Objective:    Patient was able to participate and benefit from treatment as evidenced by the patient s verbal expression and understanding of idea discussed.     New Symptoms or Complaints: The patient did not disclose any new symptoms to the undersigned.     Reason Patient is participating in the group: The group session was necessary for the care of the patient to address how symptoms impact the patient s mental health, relationships and overall well-being.      Patient's response to current intervention: Patient was receptive of feedback given, supportive of other group members and appears to have benefited from the group process. No barriers to learning evidenced.      Progress toward short-term goals: The patient completed the assigned homework which was to assess maladaptive thinking and its connections to the patient s pervious and/or current behaviors.     Patient's Impressions: The patient indicated readiness to learn by the patient s choice to attend  group.     Review of long term goals: See treatment plan developed by outpatient counselor.    Are there any new goals: There were no new goals brought to the undersigned s attention.      The patient completed today s group session for the processing of Mental Health Symptoms and its relationship to the patient s Chemical Dependency. Group content and interventions performed in this session: Check-in, the connection between thoughts, feelings, and behaviors. Group members talked about ways to reduce stress and finding reta in life.     Patient education on the above topics was provided during this session. The patient was given an opportunity to ask questions and participate in the group discussion. The patient exhibited individual understanding by asking relevant questions and making appropriate comments to other group members.    Patient indicated upon onset of group that his anxiety was a 2 out of 10. Patient reported he felt good.     Assessment:  1. Paranoid schizophrenia, chronic condition with acute exacerbation        No indication of SI, plan and/or means.     Providers Impression of Current Status:   Patient participated actively in today's group therapy session and appears to have learned the impact of one s thinking on feelings and behaviors. Patient will continue to benefit from ongoing psychotherapy that uses CBT and MI.     Psychotherapeutic Techniques: A cognitive behavioral modality was used.     PLAN:    Follow-up: Continue to attend groups to address mental health symptom(s)     Discharge Criteria Planning: Alleviation of mental health symptoms.     Encounter performed and documented by Petty Storm MA, [facility]

## 2021-06-14 NOTE — PROGRESS NOTES
Staff message per Dr. Johnson     Psychiatric nurse to call the patient to ensure that he is stable psychiatrically, and no psychiatric issues or side effects of psychiatric medications contributed to his fall in September.EG

## 2021-06-14 NOTE — PROGRESS NOTES
This nurse saw the pt last Wednesday after his group. Pt was pleasant to talk, had good facial expression,  was joking and smiling, was appropriate and involved in conversation. 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   Pt appeared restless; was swaying side to side. Reviewed his medical records. He was referred and evaluated for TD at [place] in September, was prescribed NAC for akathisia.  Spoke to pt today. His mother was at work and will be available after 2:30 pm. He denies SE, oversedation, or dizziness; said he stopped NAC due to pain in his calf.

## 2021-06-14 NOTE — PROGRESS NOTES
"Spoke to his mother, pt is doing well mentally. She does not think his fall related to medications, said pt simply tripped over a gutter. Although, she did notice abnormal body movement, says pt can control it, for example he is able to sit still in the [employer]. He took Propranolol and NAC only for few days, told his mother that his leg hurts from NAC and Propranolol makes him feel like \" he was made of wood\"   Current meds: B12 and Prolixin  "

## 2021-06-14 NOTE — TELEPHONE ENCOUNTER
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 Fax: [phone] - today's  injection was cancelled due to lack of refills.       Date of Last Office Visit: 4/9/20  Date of Next Office Visit: 5/13/19  No shows since last visit: none  Cancellations since last visit: 9/3/20 - provider out  ED visits since last visit: No    Medication Prolixin 25 mg date last ordered: 7/15/20  Qty: 1  Refills: 12     Disp Refills Start End CHANCE   fluPHENAZine decanoate (PROLIXIN) 25 mg/mL injection 1 mL 12 7/15/2020  No   Sig - Route: Inject 1 mL (25 mg total) into the shoulder, thigh, or buttocks every 14 (fourteen) days. - Intramuscular   Class: No Print       Lapse in therapy greater than 7 days: No  Medication refill request verified as identical to current order: yes  Result of Last DAM, VPA, Li+ Level, CBC, or Carbamazepine Level (at or since last visit): NA      []Eligibility - not seen in last year    []Supervision - no future appointment    []Compliance     []Verification - order discrepancy    []Controlled Medication    []90 - day supply request    [x]Other LPN pending medications    Current Medication list:      fluPHENAZine decanoate (PROLIXIN) 25 mg/mL injection Inject 1 mL (25 mg total) into the shoulder, thigh, or buttocks every 14 (fourteen) days.   latanoprost (XALATAN) 0.005 % ophthalmic solution Administer 1 drop to both eyes at bedtime.         Medication Plan of Care at last office visit with MD/CNP:    PLAN:     Treatment Plan:  1. The patient was provided with education and detailed written and verbal instructions in native language on diagnosis, future follow-up, and treatment plan, same instructions were also provided in English language.   2. Instructed to return to clinic in  3 months or sooner if needed.  3. Nurse only clinic is available in the interim if needed.  4. Crisis plan in place.  5.  Consider  referral to neurology -  movement disorder clinic at the [facility] Dr. Crow for evaluation of dyskinesia if progression is noted - stable now with some improvent    [place], [place], [place], and [place] : NA

## 2021-06-14 NOTE — PROGRESS NOTES
November 24, 2017    I reviewed 4 pages of paperwork from [facility] related to the patient's emergency room visit due to fall and and hand injury.  Lacerations and area of skin avulsion of the hands after a fall on his driveway, wounds were cleaned and carefully evaluated and explored.  Sutures were placed.  Will further discuss during patient's clinic visit on January 9, 2018.  I plan to discuss his psychiatric medication management during next visit to rule out potential side effects of psychotropics contributing to fall.  I also ordered psychiatric nurse to call the patient to ensure that he is stable psychiatrically, and no psychiatric issues or side effects of psychiatric medications contributed to his fall.       All entries were submitted into epic personally by me in preparation of of documentation and generation of documentation of this no contact encounter.     Total time 30  minutes - no contact encounter.      Sue Blackwell  11/24/2017  11:51 AM

## 2021-06-14 NOTE — PROGRESS NOTES
January 9, 2018    1.  I reviewed 4 pages of paperwork from [facility] related to the patient's emergency room visit due to fall and and hand injury.  Lacerations and area of skin avulsion of the hands after a fall on his driveway, wounds were cleaned and carefully evaluated and explored.  Sutures were placed.  Will further discuss during patient's clinic visit on January 9, 2018.  I plan to discuss his psychiatric medication management during next visit to rule out potential side effects of psychotropics contributing to fall.  I also ordered psychiatric nurse to call the patient to ensure that he is stable psychiatrically, and no psychiatric issues or side effects of psychiatric medications contributed to his fall.  2.  Neurology consultation: 3 pages of encounter were reviewed,Diagnosis of akathisia, acetylcysteine 600 mg was started, the patient self stopped it, he says that it caused him leg pain, and the pain is resolved since he stopped the medication.  He was also prescribed propranolol 10 mg, but he also self stopped it, says that it made him feel very tired and uncomfortable.  Recommended to have a follow-up appointment with neurologist, efforts were made to contact the patient's mother to advise her to assist the patient with his appointment  3.  Review of records from ophthalmologist Dr. Braga who is following the patient for glaucoma, total 4 pages, latanoprost ophthalmological drops were prescribed  4. Additional nursing notes were reviewed  Submitted separately from this account this patient's mother retuned call,reported no complaints, and no particular concerns, she believes that her current medication regimen works well for the patient, except for medication for movement disorder as reflected previously      All entries were submitted into epic personally by me in preparation of of documentation and generation of documentation of this no contact encounter.     Total time 32  minutes - no  contact encounter.      Sue Blackwell  11/24/2017  11:51 AM

## 2021-06-15 NOTE — PROGRESS NOTES
Mental Health Processing Group    Date of Service: 01/03/2018 Start Time: 2:00 Stop Time: 3:00 Total Time: 60 minutes  Frequency: Wednesdays    CPT: 19706    Care Provider: Petty Storm MA, [facility]  N=  4    [employer] interpretor was used for this session.     Subjective: The patient was seen today for a 60 minute group psychotherapy session held at [facility].     Mental Status Exam:    Patient was pleasant and cooperative. The patient was oriented X4. The patient made good eye contact and was well dressed with good grooming and hygiene. Recent and remote memories were intact. Concentration and focus was normal. Speech (tone, volume, and rate) were intact. Mood and affect were congruently anxious but appropriate for the content of the session. Fund of knowledge was normal. Thought process was mostly logical and linear. Insight and judgment were intact. Patient denied SI, plan and/or means. Pt continues to be motivated for treatment.     Objective:    Patient was able to participate and benefit from treatment as evidenced by the patient s verbal expression and understanding of idea discussed.     New Symptoms or Complaints: The patient did not disclose any new symptoms to the undersigned.     Reason Patient is participating in the group: The group session was necessary for the care of the patient to address how symptoms impact the patient s mental health, relationships and overall well-being.      Patient's response to current intervention: Patient was receptive of feedback given, supportive of other group members and appears to have benefited from the group process. No barriers to learning evidenced.      Progress toward short-term goals: The patient completed the assigned homework which was to assess maladaptive thinking and its connections to the patient s pervious and/or current behaviors.     Patient's Impressions: The patient indicated readiness to learn by the patient s choice to attend  group.     Review of long term goals: See treatment plan developed by outpatient counselor.    Are there any new goals: There were no new goals brought to the undersigned s attention.      The patient completed today s group session for the processing of Mental Health Symptoms and its relationship to the patient s Chemical Dependency. Group content and interventions performed in this session: Check-in, the connection between thoughts, feelings, and behaviors. Group members talked about struggles self-care and finding reta during the winter.     Patient education on the above topics was provided during this session. The patient was given an opportunity to ask questions and participate in the group discussion. The patient exhibited individual understanding by asking relevant questions and making appropriate comments to other group members.    Patient indicated upon onset of group that his anxiety was a 2 out of 10. Patient reported he felt good.     Assessment:  1. Paranoid schizophrenia, chronic condition with acute exacerbation      No indication of SI, plan and/or means.     Providers Impression of Current Status:   Patient participated actively in today's group therapy session and appears to have learned the impact of one s thinking on feelings and behaviors. Patient will continue to benefit from ongoing psychotherapy that uses CBT and MI.     Psychotherapeutic Techniques: A cognitive behavioral modality was used.     PLAN:    Follow-up: Continue to attend groups to address mental health symptom(s)     Discharge Criteria Planning: Alleviation of mental health symptoms.     Encounter performed and documented by Petty Storm MA, [facility]

## 2021-06-15 NOTE — PROGRESS NOTES
Pt is here for culturally sensitive psychiatric med management follow up. Client says he is doing well, attends groups every Wednesday. Still dreaming about girls but not as often, will be re-starting work in January.     [place] Date: 18  Query Report Page#: 1  Patient Rx History Report  LUDA NAGY  Search Criteria: Last Name 'Luda' and First Name 'Yakov' and  =  and Request Period =  to  ' - 2 out of 2 Recipients Selected.  *N/R N=New R=Refill  +MED Daily  Patients that match search criteria  ----------------------------------------------------------------------------------------------------------------------------------  [id number] ALICIAMARTIN YAKOV,  82[id number] [id number]  [id number] LUDA NAGY,  [birth year]; [address] [id number]  **Per CDC guidance, the conversion factors and associated daily morphine milligram equivalents for drugs prescribed as part of  medication-assisted treatment for opioid use disorder should not be used to benchmark against dosage thresholds meant for opioids  prescribed for pain.    Correct pharmacy verified with patient and confirmed in snapshot? [x] yes []no    Charge captured ? [] yes  [x] no    Medications Phoned  to Pharmacy [] yes [x]no  Name of Pharmacist:  List Medications, including dose, quantity and instructions      Medication Prescriptions given to patient   [] yes  [x] no   List the name of the drug the prescription was written for.       Medications ordered this visit were e-scribed.  Verified by order class [x] yes  [] no  Prolixin  Medication changes or discontinuations were communicated to patient's pharmacy: [] yes  [x] no    UA collected [] yes  [x] no    [place] Prescription Monitoring Program Reviewed? [x] yes  [] no    Referrals were made to:  none    Future appointment was made: [x] yes  [] no  4/10/18  Dictation completed at time of chart check: [] yes   [x] no    I have checked the documentation for today s encounters and the above information has been reviewed and completed.

## 2021-06-15 NOTE — PROGRESS NOTES
OUTPATIENT PROGRESS NOTE      Date of visit January 9, 2018    [place] name  Waylon Murdock  Mother's name Ilene Nichole    Reasons For Visit Are Multiple Today:   1.  Culturally sensitive follow-up for mental health issues  2.  Involuntary rocking  movements of the torso dyskinesia, stable, and restlessness, improved  3.  Follow-up for schizoaffective disorder, no symptoms of psychosis at this time  4.  Follow-up for david, no symptoms of david at this time  5.  High risk medication use: involuntary movements are likely related to Prolixin  6.  Neurology consultation: 3 pages of encounter were reviewed,Diagnosis of akathisia, acetylcysteine 600 mg was started, the patient self stopped it, he says that it caused him leg pain, and the pain is resolved since he stopped the medication.  He was also prescribed propranolol 10 mg, but he also self stopped it, says that it made him feel very tired and uncomfortable.  7.  [place] cultural issues: The interview is conducted in [place] language  8.  Education on current medication regimen  9.  Education on future follow-up appointments  10.  Ego activities: The patient is active in [employer], singing in the [employer] choir  11. High risk medication use assessment of movements, discus  score 4, movements of shoulder and body rocking, I noted rocking movements and restlessness, which is related to akathisia.  So score is 0  12.  Question about follow-up with neurology, as he does not want propranolol or acetylcysteine, so I advised the patient to follow-up with  to eat to see if somebody else could be prescribed for movement disorder.  13.  Hand written note from the patient's mother with brief collateral information and report of self discontinuation of acetylcysteine and propranolol  14.  Renewal of prescription  14.  Telephone call to the patient's mother for collateral information, as she usually likes to be contacted with update, telephone #[phone], message was left,  at a later time his mother retuned to call, and left collateral information, I reviewed it and discussed with the team, see separate nursing note and my note    History of present illness/Subjective:  The patient is Tristanian speaking. The interview is conducted in Tristanian language, translated personally by me into English records which adds additional element of complexity to this visit and my assessment. multiple questions and issues were addressed as reflected above.  Treatment plan was discussed in details.  patient is calm, cooperative. No behavioral or stated evidence of   excessive activation.  No behavioral or stated evidence of symptoms of david or psychosis or other mood symptoms /behaviors.  Denying SI/HI/voices/visions/telepathic communication/imaginary girlfriends/thoughts of cutting his thumb off.  Appears to be at baseline.  Compliant.  Appropriately engaging for his level of function.  Smiling appropriately.     Medications:      Current Outpatient Prescriptions   Medication Sig Dispense Refill     CYANOCOBALAMIN, VITAMIN B-12, (VITAMIN B12 ORAL) Take 500 mg by mouth daily.        fluPHENAZine (PROLIXIN) 5 MG tablet Take 1 tablet (5 mg total) by mouth 2 (two) times a day. 180 tablet 0     acetylcysteine (NAC) 600 mg cap capsule Take 600 mg by mouth daily.       propranolol (INDERAL) 10 MG tablet Take 10 mg by mouth 2 (two) times a day as needed.       No current facility-administered medications for this visit.          Family history/Social history   Lives with his parents.  Unemployed on SSI.  Volunteers at [employer]          Procedures:  1. Coordination of care: nursing notes, vital signs, multiple records of communication between the patient and the clinic, communication with the pharmacy, and multiple medication orders were reviewed signed and discussed with the patient. Multiple chart entries were reviewed in preparation of documentation and generation of documentation.  2.  Education: was provided  "on diagnosis, medication regimen, psychotherapeutic treatment modalities, future follow-up appointments.  3.  Counseling: was provided on coping with mental illness.  4.  Collateral information was obtained from  mother  5.  Coordination of care: I  entered all orders and educated the patient as above  6.  Coordination of care: I personally coordinated all medication orders, follow up orders, pharmacy requests, and provided the patient with detailed instructions  in his native [place] language  7. [place] cultural and immigration issues were addressed, the interview was conducted in [place] language, [place] medications were discussed as it is common in [place] speaking community to take Russian produced medications which could be dangerous. The patient takes  none at this time      Review Of Systems:  As above.   Hypersomnia with melatonin.  The reminder of 10 systems was negative.      Vital Signs:    /89 (Patient Site: Right Arm, Patient Position: Sitting, Cuff Size: Adult Large)  Pulse 94  Temp 98.1  F (36.7  C) (Oral)   Ht 5' 6\" (1.676 m)  Wt (!) 253 lb (114.8 kg)  BMI 40.84 kg/m2    Mental Status Examination:     Appearance  calm    Alert and oriented ×3    Attention and concentration  normal    Speech  fluent    Mood  good    Affect  appropriate    Thought processing  logical    Associations  normal    Thought content  no SI/HI/psychosis    Language  normal in [place]    Short term memory  normal    Long term memory  normal    Fund of knowledge  normal    Psychomotor activity  normal    Gait and station  normal    Insight and judgment  normal        Limited Physical Examination:  Was performed  due to restlessness and akathisia related to Prolixin.  Rocking body movements.  Adequately nourished.  Overweight.      Laboratory Data:    personally reviewed.   No visits with results within 2 Month(s) from this visit.  Latest known visit with results is:    Admission on 07/16/2016, Discharged on " 08/18/2016   Component Date Value     Bilirubin, Total 07/27/2016 0.4      Bilirubin, Direct 07/27/2016 0.1      Protein, Total 07/27/2016 7.6      Albumin 07/27/2016 3.9      Alkaline Phosphatase 07/27/2016 91      AST 07/27/2016 20      ALT 07/27/2016 48*     WBC 07/27/2016 7.8      RBC 07/27/2016 5.11      Hemoglobin 07/27/2016 14.7      Hematocrit 07/27/2016 44.3      MCV 07/27/2016 87      MCH 07/27/2016 28.8      MCHC 07/27/2016 33.2      RDW 07/27/2016 12.5      Platelets 07/27/2016 291      MPV 07/27/2016 10.1      Neutrophils % 07/27/2016 63      Lymphocytes % 07/27/2016 29      Monocytes % 07/27/2016 6      Eosinophils % 07/27/2016 2      Basophils % 07/27/2016 1      Neutrophils Absolute 07/27/2016 4.9      Lymphocytes Absolute 07/27/2016 2.3      Monocytes Absolute 07/27/2016 0.5      Eosinophils Absolute 07/27/2016 0.1      Basophils Absolute 07/27/2016 0.0          Diagnosis:  Past Medical History:   Diagnosis Date     Bipolar I disorder      Calcified granuloma of lung 2014     Obesity (BMI 35.0-39.9 without comorbidity)      Paranoid schizophrenia      Vitamin B 12 deficiency        Patient Active Problem List   Diagnosis     Mood disorder in conditions classified elsewhere     Left arm pain     Dandruff     Other dysfunctions of sleep stages or arousal from sleep     Weight gain     Back ache     Clinical depression     Chronic paranoid schizophrenia     Neuroleptic consent form discussed and signed: July 17, 2016           Schizoaffective disorder, chronic  Akathisia and movement disorder   High risk medication use        Treatment Plan:  1. The patient was provided with education and detailed written and verbal instructions in native language on diagnosis, future follow-up, and treatment plan, same instructions were also provided in English language.   2. Instructed to return to clinic in  3-4 months or sooner if needed.  3. Nurse only clinic is available in the interim if needed.  4. Crisis plan  in place.  5. Referral to a culturally sensitive Russian speaking therapist Dr. Albarran was provided.    6.  Continue Prolixin 5 mg twice daily  7. Close monitoring for side effects  8.  Discontinue propranolol due to low lack of benefit and side effects, the patient already self stopped it  9. Continue outpatient support groups as previously ordered for stability and prevention of exacerbations, as they tend to be quite severe for Waylon, leading to commitments and hospitalizations  10.  Follow-up with neurology for consideration of a different medication for movement disorder.  11.  Will try again to get hold of the patient's mother, as she is the one who helps him to coordinate appointments, as the patient himself is quite amotivated and unlikely to pursue it      Complex visit, multiple issues were addressed as reflected above, total of 14 issues, total time 40 minutes more than 50% coordination of care, education, counseling, [place] cultural issues, telephone call, review of written communication from the patient's mother, and clinic records and multiple chart and Epic  entries in preparation and generation of pertinent documentation, generation of multiple Epic entries and visit related orders, other details are fully reflected in the procedures paragraph. Please see associated nursing records for other details.         This note was created by undersigned using a Dragon dictation system. All typing errors or contextual distortion are unintentional and software inherent.     Sue Johnson MD

## 2021-06-15 NOTE — PROGRESS NOTES
Mental Health Processing Group    Date of Service: 02/7/2018 Start Time: 2:00 Stop Time: 3:00 Total Time: 60 minutes  Frequency: Wednesdays    CPT: 56844    Care Provider: Petty Storm MA, [facility]  N=  2    [employer] interpretor was used for this session.     Subjective: The patient was seen today for a 60 minute group psychotherapy session held at [facility].     Mental Status Exam:    Patient was pleasant and cooperative. The patient was oriented X4. The patient made good eye contact and was well dressed with good grooming and hygiene. Recent and remote memories were intact. Concentration and focus was normal. Speech (tone, volume, and rate) were intact. Mood and affect were congruently anxious but appropriate for the content of the session. Fund of knowledge was normal. Thought process was mostly logical and linear. Insight and judgment were intact. Patient denied SI, plan and/or means. Pt continues to be motivated for treatment.     Objective:    Patient was able to participate and benefit from treatment as evidenced by the patient s verbal expression and understanding of idea discussed.     New Symptoms or Complaints: The patient did not disclose any new symptoms to the undersigned.     Reason Patient is participating in the group: The group session was necessary for the care of the patient to address how symptoms impact the patient s mental health, relationships and overall well-being.      Patient's response to current intervention: Patient was receptive of feedback given, supportive of other group members and appears to have benefited from the group process. No barriers to learning evidenced.      Progress toward short-term goals: The patient completed the assigned homework which was to assess maladaptive thinking and its connections to the patient s pervious and/or current behaviors.     Patient's Impressions: The patient indicated readiness to learn by the patient s choice to attend  group.     Review of long term goals: See treatment plan developed by outpatient counselor.    Are there any new goals: There were no new goals brought to the undersigned s attention.      The patient completed today s group session for the processing of Mental Health Symptoms and its relationship to the patient s Chemical Dependency. Group content and interventions performed in this session: Check-in, the connection between thoughts, feelings, and behaviors. Group members talked about the struggles with change and important of not avoiding emotions.     Patient education on the above topics was provided during this session. The patient was given an opportunity to ask questions and participate in the group discussion. The patient exhibited individual understanding by asking relevant questions and making appropriate comments to other group members.    Patient indicated upon onset of group that his anxiety was a 3 out of 10. Patient reported he felt okay.     Assessment:  1. Schizoaffective disorder, bipolar type      No indication of SI, plan and/or means.     Providers Impression of Current Status:   Patient participated actively in today's group therapy session and appears to have learned the impact of one s thinking on feelings and behaviors. Patient will continue to benefit from ongoing psychotherapy that uses CBT and MI.     Psychotherapeutic Techniques: A cognitive behavioral modality was used.     PLAN:    Follow-up: Continue to attend groups to address mental health symptom(s)     Discharge Criteria Planning: Alleviation of mental health symptoms.     Encounter performed and documented by Petty Storm MA, [facility]

## 2021-06-15 NOTE — PROGRESS NOTES
Mental Health Processing Group    Date of Service: 01/17/2018 Start Time: 2:00 Stop Time: 3:00 Total Time: 60 minutes  Frequency: Wednesdays    CPT: 56403    Care Provider: Petty Storm MA, [facility]  N=  4    [employer] interpretor was used for this session.    Subjective: The patient was seen today for a 60 minute group psychotherapy session held at [facility].     Mental Status Exam:    Patient was pleasant and cooperative. The patient was oriented X4. The patient made good eye contact and was well dressed with good grooming and hygiene. Recent and remote memories were intact. Concentration and focus was normal. Speech (tone, volume, and rate) were intact. Mood and affect were congruently anxious but appropriate for the content of the session. Fund of knowledge was normal. Thought process was mostly logical and linear. Insight and judgment were intact. Patient denied SI, plan and/or means. Pt continues to be motivated for treatment.     Objective:    Patient was able to participate and benefit from treatment as evidenced by the patient s verbal expression and understanding of idea discussed.     New Symptoms or Complaints: The patient did not disclose any new symptoms to the undersigned.     Reason Patient is participating in the group: The group session was necessary for the care of the patient to address how symptoms impact the patient s mental health, relationships and overall well-being.      Patient's response to current intervention: Patient was receptive of feedback given, supportive of other group members and appears to have benefited from the group process. No barriers to learning evidenced.      Progress toward short-term goals: The patient completed the assigned homework which was to assess maladaptive thinking and its connections to the patient s pervious and/or current behaviors.     Patient's Impressions: The patient indicated readiness to learn by the patient s choice to attend  group.     Review of long term goals: See treatment plan developed by outpatient counselor.    Are there any new goals: There were no new goals brought to the undersigned s attention.      The patient completed today s group session for the processing of Mental Health Symptoms and its relationship to the patient s Chemical Dependency. Group content and interventions performed in this session: Check-in, the connection between thoughts, feelings, and behaviors. Group members talked about struggles self-care and finding reta during the winter.     Patient education on the above topics was provided during this session. The patient was given an opportunity to ask questions and participate in the group discussion. The patient exhibited individual understanding by asking relevant questions and making appropriate comments to other group members.    Patient indicated upon onset of group that his anxiety was a 2 out of 10. Patient reported he felt good.     Assessment:  1. Schizoaffective disorder, bipolar type      No indication of SI, plan and/or means.     Providers Impression of Current Status:   Patient participated actively in today's group therapy session and appears to have learned the impact of one s thinking on feelings and behaviors. Patient will continue to benefit from ongoing psychotherapy that uses CBT and MI.     Psychotherapeutic Techniques: A cognitive behavioral modality was used.     PLAN:    Follow-up: Continue to attend groups to address mental health symptom(s)     Discharge Criteria Planning: Alleviation of mental health symptoms.     Encounter performed and documented by Petty Storm MA, [facility]

## 2021-06-15 NOTE — PROGRESS NOTES
Mental Health Processing Group    Date of Service: 12/27/2017 Start Time: 2:00 Stop Time: 3:00 Total Time: 60 minutes  Frequency: Wednesdays    CPT: 44277    Care Provider: Petty Storm MA, [facility]  N=  3    [employer] interpretor was used for this session.     Subjective: The patient was seen today for a 60 minute group psychotherapy session held at [facility].     Mental Status Exam:    Patient was pleasant and cooperative. The patient was oriented X4. The patient made good eye contact and was well dressed with good grooming and hygiene. Recent and remote memories were intact. Concentration and focus was normal. Speech (tone, volume, and rate) were intact. Mood and affect were congruently anxious but appropriate for the content of the session. Fund of knowledge was normal. Thought process was mostly logical and linear. Insight and judgment were intact. Patient denied SI, plan and/or means. Pt continues to be motivated for treatment.     Objective:    Patient was able to participate and benefit from treatment as evidenced by the patient s verbal expression and understanding of idea discussed.     New Symptoms or Complaints: The patient did not disclose any new symptoms to the undersigned.     Reason Patient is participating in the group: The group session was necessary for the care of the patient to address how symptoms impact the patient s mental health, relationships and overall well-being.      Patient's response to current intervention: Patient was receptive of feedback given, supportive of other group members and appears to have benefited from the group process. No barriers to learning evidenced.      Progress toward short-term goals: The patient completed the assigned homework which was to assess maladaptive thinking and its connections to the patient s pervious and/or current behaviors.     Patient's Impressions: The patient indicated readiness to learn by the patient s choice to attend  group.     Review of long term goals: See treatment plan developed by outpatient counselor.    Are there any new goals: There were no new goals brought to the undersigned s attention.      The patient completed today s group session for the processing of Mental Health Symptoms and its relationship to the patient s Chemical Dependency. Group content and interventions performed in this session: Check-in, the connection between thoughts, feelings, and behaviors. Group members talked about struggles with large groups and feeling uncomfortable around people.      Patient education on the above topics was provided during this session. The patient was given an opportunity to ask questions and participate in the group discussion. The patient exhibited individual understanding by asking relevant questions and making appropriate comments to other group members.    Patient indicated upon onset of group that his anxiety was a 2 out of 10. Patient reported he felt good.     Assessment:  1. Paranoid schizophrenia, chronic condition with acute exacerbation      No indication of SI, plan and/or means.     Providers Impression of Current Status:   Patient participated actively in today's group therapy session and appears to have learned the impact of one s thinking on feelings and behaviors. Patient will continue to benefit from ongoing psychotherapy that uses CBT and MI.     Psychotherapeutic Techniques: A cognitive behavioral modality was used.     PLAN:    Follow-up: Continue to attend groups to address mental health symptom(s)     Discharge Criteria Planning: Alleviation of mental health symptoms.     Encounter performed and documented by Petty Storm MA, [facility]

## 2021-06-15 NOTE — PROGRESS NOTES
Mental Health Processing Group    Date of Service: 01/31/2018 Start Time: 2:00 Stop Time: 3:00 Total Time: 60 minutes  Frequency: Wednesdays    CPT: 33156    Care Provider: Petty Storm MA, [facility]  N=  3    [employer] interpretor was used for this session.     Subjective: The patient was seen today for a 60 minute group psychotherapy session held at [facility].     Mental Status Exam:    Patient was pleasant and cooperative. The patient was oriented X4. The patient made good eye contact and was well dressed with good grooming and hygiene. Recent and remote memories were intact. Concentration and focus was normal. Speech (tone, volume, and rate) were intact. Mood and affect were congruently anxious but appropriate for the content of the session. Fund of knowledge was normal. Thought process was mostly logical and linear. Insight and judgment were intact. Patient denied SI, plan and/or means. Pt continues to be motivated for treatment.     Objective:    Patient was able to participate and benefit from treatment as evidenced by the patient s verbal expression and understanding of idea discussed.     New Symptoms or Complaints: The patient did not disclose any new symptoms to the undersigned.     Reason Patient is participating in the group: The group session was necessary for the care of the patient to address how symptoms impact the patient s mental health, relationships and overall well-being.      Patient's response to current intervention: Patient was receptive of feedback given, supportive of other group members and appears to have benefited from the group process. No barriers to learning evidenced.      Progress toward short-term goals: The patient completed the assigned homework which was to assess maladaptive thinking and its connections to the patient s pervious and/or current behaviors.     Patient's Impressions: The patient indicated readiness to learn by the patient s choice to attend  group.     Review of long term goals: See treatment plan developed by outpatient counselor.    Are there any new goals: There were no new goals brought to the undersigned s attention.      The patient completed today s group session for the processing of Mental Health Symptoms and its relationship to the patient s Chemical Dependency. Group content and interventions performed in this session: Check-in, the connection between thoughts, feelings, and behaviors. Group members talked about struggles with understanding mental health and lack of motivation.     Patient education on the above topics was provided during this session. The patient was given an opportunity to ask questions and participate in the group discussion. The patient exhibited individual understanding by asking relevant questions and making appropriate comments to other group members.    Patient indicated upon onset of group that his anxiety was a 2 out of 10. Patient reported he felt okay.     Assessment:  1. Schizoaffective disorder, bipolar type      No indication of SI, plan and/or means.     Providers Impression of Current Status:   Patient participated actively in today's group therapy session and appears to have learned the impact of one s thinking on feelings and behaviors. Patient will continue to benefit from ongoing psychotherapy that uses CBT and MI.     Psychotherapeutic Techniques: A cognitive behavioral modality was used.     PLAN:    Follow-up: Continue to attend groups to address mental health symptom(s)     Discharge Criteria Planning: Alleviation of mental health symptoms.     Encounter performed and documented by Petty Storm MA, [facility]

## 2021-06-16 NOTE — TELEPHONE ENCOUNTER
Dialed the pt x 2 but no answer.     Left  for pt informing that Dr. Johnson s [facility] is (currently) through the end of May, cancelling appt on 5/13/21 and offering help in finding another mental health provider in the community.   Advised the pt at any time, if they have worsening mental health symptoms or feeling unstable to call 911 or go to the closest emergency department. If your symptoms aren t urgent/emergent or any problems with medication refills please call the [facility] and will identify a provider who can see you.    Spoke to his mother. Pt is on Prolixin injections, last Rx provided by Dr. Hui, pt should have enough orders until June.   Will have to discuss his treatment plan with the pt

## 2021-06-16 NOTE — TELEPHONE ENCOUNTER
Spoke to pt, informed them that Dr. Johnson s [facility] is (currently) through the end of May and cancelled appt on 5/13/21. Pt is on Prolixin injections prescribed at the clinic and administered at [facility] infusion center. He is British speaking only .     Provider (include name) is currently prescribing psych meds: Dr. Hui was covering for Vasile Johnson       Explained that Dr. Monroy (Psychiatrist -Medical Director) is offering to have a provider to provider telephone consult to review their care in order to support provider in ongoing management of medication.     NA    Verbal consent for provider to provider consult obtained via phone:  [ ] Yes [ ] No [ ] Declined     NA    Patient was also offered help in finding another mental health provider in the community:  [ x ] Yes [ ] No [ ] NA [ ] Declined       Advised the pt at any time, if they have worsening mental health symptoms or feeling unstable to call 911 or go to the closest emergency department. If your symptoms aren t urgent/emergent, please call the [facility] clinic and will identify a provider who can see you.

## 2021-06-16 NOTE — PROGRESS NOTES
Mental Health Processing Group    Date of Service: 03/7/2018 Start Time: 2:00 Stop Time: 3:00 Total Time: 60 minutes  Frequency: Wednesdays    CPT: [id number]    Care Provider: Petty Storm MA, [facility]  N=  4    [employer] interpretor was used for this session.     Subjective: The patient was seen today for a 60 minute group psychotherapy session held at [facility].     Mental Status Exam:    Patient was pleasant and cooperative. The patient was oriented X4. The patient made good eye contact and was well dressed with good grooming and hygiene. Recent and remote memories were intact. Concentration and focus was normal. Speech (tone, volume, and rate) were intact. Mood and affect were congruently anxious but appropriate for the content of the session. Fund of knowledge was normal. Thought process was mostly logical and linear. Insight and judgment were intact. Patient denied SI, plan and/or means. Pt continues to be motivated for treatment.     Objective:    Patient was able to participate and benefit from treatment as evidenced by the patient s verbal expression and understanding of idea discussed.     New Symptoms or Complaints: The patient did not disclose any new symptoms to the undersigned.     Reason Patient is participating in the group: The group session was necessary for the care of the patient to address how symptoms impact the patient s mental health, relationships and overall well-being.      Patient's response to current intervention: Patient was receptive of feedback given, supportive of other group members and appears to have benefited from the group process. No barriers to learning evidenced.      Progress toward short-term goals: The patient completed the assigned homework which was to assess maladaptive thinking and its connections to the patient s pervious and/or current behaviors.     Patient's Impressions: The patient indicated readiness to learn by the patient s choice to attend  group.     Review of long term goals: See treatment plan developed by outpatient counselor.    Are there any new goals: There were no new goals brought to the undersigned s attention.      The patient completed today s group session for the processing of Mental Health Symptoms and its relationship to the patient s Chemical Dependency. Group content and interventions performed in this session: Check-in, the connection between thoughts, feelings, and behaviors. Group members talked about struggles with financial decisions and isolation.     Patient education on the above topics was provided during this session. The patient was given an opportunity to ask questions and participate in the group discussion. The patient exhibited individual understanding by asking relevant questions and making appropriate comments to other group members.    Patient indicated upon onset of group that his anxiety was a 2 out of 10. Patient reported he felt okay.     Assessment:  1. Schizoaffective disorder, bipolar type      No indication of SI, plan and/or means.     Providers Impression of Current Status:   Patient participated actively in today's group therapy session and appears to have learned the impact of one s thinking on feelings and behaviors. Patient will continue to benefit from ongoing psychotherapy that uses CBT and MI.     Psychotherapeutic Techniques: A cognitive behavioral modality was used.     PLAN:    Follow-up: Continue to attend groups to address mental health symptom(s)     Discharge Criteria Planning: Alleviation of mental health symptoms.     Encounter performed and documented by Petty Storm MA, [facility]

## 2021-06-16 NOTE — PROGRESS NOTES
Mental Health Processing Group    Date of Service: 02/28/2018 Start Time: 2:00 Stop Time: 3:00 Total Time: 60 minutes  Frequency: Wednesdays    CPT: [id number]    Care Provider: Petty Storm MA, [facility]  N=  3    [employer] interpretor was used for this session.    Subjective: The patient was seen today for a 60 minute group psychotherapy session held at [facility].     Mental Status Exam:    Patient was pleasant and cooperative. The patient was oriented X4. The patient made good eye contact and was well dressed with good grooming and hygiene. Recent and remote memories were intact. Concentration and focus was normal. Speech (tone, volume, and rate) were intact. Mood and affect were congruently anxious but appropriate for the content of the session. Fund of knowledge was normal. Thought process was mostly logical and linear. Insight and judgment were intact. Patient denied SI, plan and/or means. Pt continues to be motivated for treatment.     Objective:    Patient was able to participate and benefit from treatment as evidenced by the patient s verbal expression and understanding of idea discussed.     New Symptoms or Complaints: The patient did not disclose any new symptoms to the undersigned.     Reason Patient is participating in the group: The group session was necessary for the care of the patient to address how symptoms impact the patient s mental health, relationships and overall well-being.      Patient's response to current intervention: Patient was receptive of feedback given, supportive of other group members and appears to have benefited from the group process. No barriers to learning evidenced.      Progress toward short-term goals: The patient completed the assigned homework which was to assess maladaptive thinking and its connections to the patient s pervious and/or current behaviors.     Patient's Impressions: The patient indicated readiness to learn by the patient s choice to attend  group.     Review of long term goals: See treatment plan developed by outpatient counselor.    Are there any new goals: There were no new goals brought to the undersigned s attention.      The patient completed today s group session for the processing of Mental Health Symptoms and its relationship to the patient s Chemical Dependency. Group content and interventions performed in this session: Check-in, the connection between thoughts, feelings, and behaviors. Group members talked about importance of being active and working on self-care.     Patient education on the above topics was provided during this session. The patient was given an opportunity to ask questions and participate in the group discussion. The patient exhibited individual understanding by asking relevant questions and making appropriate comments to other group members.    Patient indicated upon onset of group that his anxiety was a 1 out of 10. Patient reported he felt normal.     Assessment:  1. Schizoaffective disorder, bipolar type      No indication of SI, plan and/or means.     Providers Impression of Current Status:   Patient participated actively in today's group therapy session and appears to have learned the impact of one s thinking on feelings and behaviors. Patient will continue to benefit from ongoing psychotherapy that uses CBT and MI.     Psychotherapeutic Techniques: A cognitive behavioral modality was used.     PLAN:    Follow-up: Continue to attend groups to address mental health symptom(s)     Discharge Criteria Planning: Alleviation of mental health symptoms.     Encounter performed and documented by Petty Storm MA, [facility]

## 2021-06-16 NOTE — PROGRESS NOTES
Mental Health Processing Group    Date of Service: 02/14/2018 Start Time: 2:00 Stop Time: 3:00 Total Time: 60 minutes  Frequency: Wednesdays    CPT: [id number]    Care Provider: Petty Storm MA, [facility]  N=  3    [employer] interpretor was used for this session.     Subjective: The patient was seen today for a 60 minute group psychotherapy session held at [facility].     Mental Status Exam:    Patient was pleasant and cooperative. The patient was oriented X4. The patient made good eye contact and was well dressed with good grooming and hygiene. Recent and remote memories were intact. Concentration and focus was normal. Speech (tone, volume, and rate) were intact. Mood and affect were congruently anxious but appropriate for the content of the session. Fund of knowledge was normal. Thought process was mostly logical and linear. Insight and judgment were intact. Patient denied SI, plan and/or means. Pt continues to be motivated for treatment.     Objective:    Patient was able to participate and benefit from treatment as evidenced by the patient s verbal expression and understanding of idea discussed.     New Symptoms or Complaints: The patient did not disclose any new symptoms to the undersigned.     Reason Patient is participating in the group: The group session was necessary for the care of the patient to address how symptoms impact the patient s mental health, relationships and overall well-being.      Patient's response to current intervention: Patient was receptive of feedback given, supportive of other group members and appears to have benefited from the group process. No barriers to learning evidenced.      Progress toward short-term goals: The patient completed the assigned homework which was to assess maladaptive thinking and its connections to the patient s pervious and/or current behaviors.     Patient's Impressions: The patient indicated readiness to learn by the patient s choice to attend  group.     Review of long term goals: See treatment plan developed by outpatient counselor.    Are there any new goals: There were no new goals brought to the undersigned s attention.      The patient completed today s group session for the processing of Mental Health Symptoms and its relationship to the patient s Chemical Dependency. Group content and interventions performed in this session: Check-in, the connection between thoughts, feelings, and behaviors. Group members talked about ways to work on relaxation and sleep hygiene.     Patient education on the above topics was provided during this session. The patient was given an opportunity to ask questions and participate in the group discussion. The patient exhibited individual understanding by asking relevant questions and making appropriate comments to other group members.    Patient indicated upon onset of group that his anxiety was a 2 out of 10. Patient reported he felt good.     Assessment:  1. Schizoaffective disorder, bipolar type      No indication of SI, plan and/or means.     Providers Impression of Current Status:   Patient participated actively in today's group therapy session and appears to have learned the impact of one s thinking on feelings and behaviors. Patient will continue to benefit from ongoing psychotherapy that uses CBT and MI.     Psychotherapeutic Techniques: A cognitive behavioral modality was used.     PLAN:    Follow-up: Continue to attend groups to address mental health symptom(s)     Discharge Criteria Planning: Alleviation of mental health symptoms.     Encounter performed and documented by Petty Storm MA, [facility]

## 2021-06-16 NOTE — PROGRESS NOTES
Mental Health Processing Group    Date of Service: 03/14/2018 Start Time: 2:00 Stop Time: 3:00 Total Time: 60 minutes  Frequency: Wednesdays    CPT: [id number]    Care Provider: Petty Storm MA, [facility]  N=  4    [employer] interpretor was used for this session.     Subjective: The patient was seen today for a 60 minute group psychotherapy session held at [facility].     Mental Status Exam:    Patient was pleasant and cooperative. The patient was oriented X4. The patient made good eye contact and was well dressed with good grooming and hygiene. Recent and remote memories were intact. Concentration and focus was normal. Speech (tone, volume, and rate) were intact. Mood and affect were congruently anxious but appropriate for the content of the session. Fund of knowledge was normal. Thought process was mostly logical and linear. Insight and judgment were intact. Patient denied SI, plan and/or means. Pt continues to be motivated for treatment.     Objective:    Patient was able to participate and benefit from treatment as evidenced by the patient s verbal expression and understanding of idea discussed.     New Symptoms or Complaints: The patient did not disclose any new symptoms to the undersigned.     Reason Patient is participating in the group: The group session was necessary for the care of the patient to address how symptoms impact the patient s mental health, relationships and overall well-being.      Patient's response to current intervention: Patient was receptive of feedback given, supportive of other group members and appears to have benefited from the group process. No barriers to learning evidenced.      Progress toward short-term goals: The patient completed the assigned homework which was to assess maladaptive thinking and its connections to the patient s pervious and/or current behaviors.     Patient's Impressions: The patient indicated readiness to learn by the patient s choice to attend  group.     Review of long term goals: See treatment plan developed by outpatient counselor.    Are there any new goals: There were no new goals brought to the undersigned s attention.      The patient completed today s group session for the processing of Mental Health Symptoms and its relationship to the patient s Chemical Dependency. Group content and interventions performed in this session: Check-in, the connection between thoughts, feelings, and behaviors. Group members talked about identifying major stressors and importance of exercise.     Patient education on the above topics was provided during this session. The patient was given an opportunity to ask questions and participate in the group discussion. The patient exhibited individual understanding by asking relevant questions and making appropriate comments to other group members.    Patient indicated upon onset of group that his anxiety was a 2 out of 10. Patient reported he felt good.     Assessment:  1. Schizoaffective disorder, bipolar type      No indication of SI, plan and/or means.     Providers Impression of Current Status:   Patient participated actively in today's group therapy session and appears to have learned the impact of one s thinking on feelings and behaviors. Patient will continue to benefit from ongoing psychotherapy that uses CBT and MI.     Psychotherapeutic Techniques: A cognitive behavioral modality was used.     PLAN:    Follow-up: Continue to attend groups to address mental health symptom(s)     Discharge Criteria Planning: Alleviation of mental health symptoms.

## 2021-06-16 NOTE — PROGRESS NOTES
Mental Health Processing Group    Date of Service: 03/21/2018 Start Time: 2:00 Stop Time: 3:00 Total Time: 60 minutes  Frequency: Wednesdays    CPT: [id number]    Care Provider: Petty Storm MA, [facility]  N=  4    [employer] interpretor was used for this session.     Subjective: The patient was seen today for a 60 minute group psychotherapy session held at [facility].     Mental Status Exam:    Patient was pleasant and cooperative. The patient was oriented X4. The patient made good eye contact and was well dressed with good grooming and hygiene. Recent and remote memories were intact. Concentration and focus was normal. Speech (tone, volume, and rate) were intact. Mood and affect were congruently anxious but appropriate for the content of the session. Fund of knowledge was normal. Thought process was mostly logical and linear. Insight and judgment were intact. Patient denied SI, plan and/or means. Pt continues to be motivated for treatment.     Objective:    Patient was able to participate and benefit from treatment as evidenced by the patient s verbal expression and understanding of idea discussed.     New Symptoms or Complaints: The patient did not disclose any new symptoms to the undersigned.     Reason Patient is participating in the group: The group session was necessary for the care of the patient to address how symptoms impact the patient s mental health, relationships and overall well-being.      Patient's response to current intervention: Patient was receptive of feedback given, supportive of other group members and appears to have benefited from the group process. No barriers to learning evidenced.      Progress toward short-term goals: The patient completed the assigned homework which was to assess maladaptive thinking and its connections to the patient s pervious and/or current behaviors.     Patient's Impressions: The patient indicated readiness to learn by the patient s choice to attend  group.     Review of long term goals: See treatment plan developed by outpatient counselor.    Are there any new goals: There were no new goals brought to the undersigned s attention.      The patient completed today s group session for the processing of Mental Health Symptoms and its relationship to the patient s Chemical Dependency. Group content and interventions performed in this session: Check-in, the connection between thoughts, feelings, and behaviors. Group members talked about struggles with sleeping too much and struggles get out of the house.     Patient education on the above topics was provided during this session. The patient was given an opportunity to ask questions and participate in the group discussion. The patient exhibited individual understanding by asking relevant questions and making appropriate comments to other group members.    Patient indicated upon onset of group that his anxiety was a 3 out of 10. Patient reported he felt good.     Assessment:  1. Schizoaffective disorder, bipolar type      No indication of SI, plan and/or means.     Providers Impression of Current Status:   Patient participated actively in today's group therapy session and appears to have learned the impact of one s thinking on feelings and behaviors. Patient will continue to benefit from ongoing psychotherapy that uses CBT and MI.     Psychotherapeutic Techniques: A cognitive behavioral modality was used.     PLAN:    Follow-up: Continue to attend groups to address mental health symptom(s)     Discharge Criteria Planning: Alleviation of mental health symptoms.     Encounter performed and documented by Petty Storm MA, [facility]

## 2021-06-17 NOTE — PROGRESS NOTES
Mental Health Processing Group    Date of Service: 05/2/2018 Start Time: 2:00 Stop Time: 3:00 Total Time: 60 minutes  Frequency: Wednesdays    CPT: 45945    Care Provider: Petty Storm MA, [facility]  N=  3    [employer] interpretor was used for this session.     Subjective: The patient was seen today for a 60 minute group psychotherapy session held at [facility].     Mental Status Exam:    Patient was pleasant and cooperative. The patient was oriented X4. The patient made good eye contact and was well dressed with good grooming and hygiene. Recent and remote memories were intact. Concentration and focus was normal. Speech (tone, volume, and rate) were intact. Mood and affect were congruently happy but appropriate for the content of the session. Fund of knowledge was normal. Thought process was mostly logical and linear. Insight and judgment were intact. Patient denied SI, plan and/or means. Pt continues to be motivated for treatment.     Objective:    Patient was able to participate and benefit from treatment as evidenced by the patient s verbal expression and understanding of idea discussed.     New Symptoms or Complaints: The patient did not disclose any new symptoms to the undersigned.     Reason Patient is participating in the group: The group session was necessary for the care of the patient to address how symptoms impact the patient s mental health, relationships and overall well-being.      Patient's response to current intervention: Patient was receptive of feedback given, supportive of other group members and appears to have benefited from the group process. No barriers to learning evidenced.      Progress toward short-term goals: The patient completed the assigned homework which was to assess maladaptive thinking and its connections to the patient s pervious and/or current behaviors.     Patient's Impressions: The patient indicated readiness to learn by the patient s choice to attend  group.     Review of long term goals: See treatment plan developed by outpatient counselor.    Are there any new goals: There were no new goals brought to the undersigned s attention.      The patient completed today s group session for the processing of Mental Health Symptoms and its relationship to the patient s Chemical Dependency. Group content and interventions performed in this session: Check-in, the connection between thoughts, feelings, and behaviors. Group members talked about test anxiety and trying to look at positive ways to move forward.     Patient education on the above topics was provided during this session. The patient was given an opportunity to ask questions and participate in the group discussion. The patient exhibited individual understanding by asking relevant questions and making appropriate comments to other group members.    Patient indicated upon onset of group that his anxiety was a 2 out of 10. Patient reported he felt good.    Assessment:  1. Schizoaffective disorder, bipolar type      No indication of SI, plan and/or means.     Providers Impression of Current Status:   Patient participated actively in today's group therapy session and appears to have learned the impact of one s thinking on feelings and behaviors. Patient will continue to benefit from ongoing psychotherapy that uses CBT and MI.     Psychotherapeutic Techniques: A cognitive behavioral modality was used.     PLAN:    Follow-up: Continue to attend groups to address mental health symptom(s)     Discharge Criteria Planning: Alleviation of mental health symptoms.     Encounter performed and documented by Petty Storm MA, [facility]

## 2021-06-17 NOTE — PROGRESS NOTES
OUTPATIENT PROGRESS NOTE      Date of visit April 10, 2018    [place] name  Waylon Murdock  Mother's name Ilene Nichole    Reasons For Visit Are Multiple Today:   1.  Culturally sensitive follow-up for mental health issues  2.  Involuntary rocking  movements of the torso dyskinesia,   improved  3.  Follow-up for schizoaffective disorder, no symptoms of psychosis at this time  4.  Follow-up for david, no symptoms of david at this time  5.  High risk medication use: involuntary movements are likely related to Prolixin, the patient was prescribed  nac   6.  Neurology consultation: 3 pages of encounter were reviewed, Diagnosis of akathisia, acetylcysteine 600 mg was started, the patient self stopped it, he says that it caused him leg pain, and the pain is resolved since he stopped the medication.  He was also prescribed propranolol 10 mg, but he also self stopped it, says that it made him feel very tired and uncomfortable.  7.  [place] cultural issues: The interview is conducted in [place] language  8.  Education on current medication regimen  9.  Education on future follow-up appointments  10.  Ego activities:  limited, mother is concerned, but we discussed that part of it is related to amotivation and avolution due to schizophrenia  11. High risk medication use assessment of movements, discus  score 2, which is improvement from previous score of 4  12.  Family conference: The patient is accompanied by his mother who was present during interview per patient's request  13.  Collateral information: Obtained from the patient's mother  14.  Concerned with weight gain: We discussed healthy eating habits, role of structure and exercise, potential future complications of excessive weight.  15.  Request to decrease the dose of Prolixin.  16.  Education on risk of recurrence of  symptoms with decreased dose of antipsychotic    History of present illness/Subjective:  The patient is [place] speaking. The interview is  conducted in Ghanaian language, translated personally by me into English records which adds additional element of complexity to this visit and my assessment. multiple questions and issues were addressed as reflected above.  Treatment plan was discussed in details.  patient is calm, cooperative. No behavioral or stated evidence of   excessive activation.  No behavioral or stated evidence of symptoms of david or psychosis or other mood symptoms /behaviors.  Denying SI/HI/voices/visions/telepathic communication/imaginary girlfriends/thoughts of cutting his thumb off.  Appears to be at baseline.  Compliant.  Appropriately engaging for his level of function.  Smiling appropriately.     Medications:      Current Outpatient Prescriptions   Medication Sig Dispense Refill     CYANOCOBALAMIN, VITAMIN B-12, (VITAMIN B12 ORAL) Take 500 mg by mouth daily.        fluPHENAZine (PROLIXIN) 2.5 MG tablet Take 1 tablet (2.5 mg total) by mouth 2 (two) times a day. 180 tablet 0     No current facility-administered medications for this visit.          Family history/Social history   Lives with his parents.  Unemployed on SSI.  Volunteers at [email or web address]          Procedures:  1. Coordination of care: nursing notes, vital signs, multiple records of communication between the patient and the clinic, communication with the pharmacy, and multiple medication orders were reviewed signed and discussed with the patient. Multiple chart entries were reviewed in preparation of documentation and generation of documentation.  2.  Education: was provided on diagnosis, medication regimen, psychotherapeutic treatment modalities, future follow-up appointments.  3.  Counseling: was provided on coping with mental illness.  4.  Collateral information was obtained from  mother  5.  Coordination of care: I  entered all orders and educated the patient as above  6.  Coordination of care: I personally coordinated all medication orders, follow up orders, pharmacy  "requests, and provided the patient with detailed instructions  in his native Egyptian language  7. Egyptian cultural and immigration issues were addressed, the interview was conducted in Egyptian language, Egyptian medications were discussed as it is common in Egyptian speaking community to take Russian produced medications which could be dangerous. The patient takes  none at this time      Review Of Systems:  As above.   Hypersomnia with melatonin.  The reminder of 10 systems was negative.      Vital Signs:    /77 (Patient Site: Left Arm, Patient Position: Sitting, Cuff Size: Adult Large)  Pulse 98  Temp 98.8  F (37.1  C) (Oral)   Ht 5' 6\" (1.676 m)  Wt (!) 251 lb (113.9 kg)  BMI 40.51 kg/m2    Mental Status Examination:     Appearance  calm    Alert and oriented ×3    Attention and concentration  normal    Speech  fluent    Mood  good    Affect  appropriate    Thought processing  logical    Associations  normal    Thought content  no SI/HI/psychosis    Language  normal in Egyptian    Short term memory  normal    Long term memory  normal    Fund of knowledge  normal    Psychomotor activity  normal    Gait and station  normal    Insight and judgment  normal        Limited Physical Examination:  Was performed  due to restlessness and akathisia related to Prolixin.  Rocking body movements are improved.  Adequately nourished.  Overweight.      Laboratory Data:    personally reviewed.   No visits with results within 2 Month(s) from this visit.  Latest known visit with results is:    Admission on 07/16/2016, Discharged on 08/18/2016   Component Date Value     Bilirubin, Total 07/27/2016 0.4      Bilirubin, Direct 07/27/2016 0.1      Protein, Total 07/27/2016 7.6      Albumin 07/27/2016 3.9      Alkaline Phosphatase 07/27/2016 91      AST 07/27/2016 20      ALT 07/27/2016 48*     WBC 07/27/2016 7.8      RBC 07/27/2016 5.11      Hemoglobin 07/27/2016 14.7      Hematocrit 07/27/2016 44.3      MCV 07/27/2016 87      MCH " 07/27/2016 28.8      MCHC 07/27/2016 33.2      RDW 07/27/2016 12.5      Platelets 07/27/2016 291      MPV 07/27/2016 10.1      Neutrophils % 07/27/2016 63      Lymphocytes % 07/27/2016 29      Monocytes % 07/27/2016 6      Eosinophils % 07/27/2016 2      Basophils % 07/27/2016 1      Neutrophils Absolute 07/27/2016 4.9      Lymphocytes Absolute 07/27/2016 2.3      Monocytes Absolute 07/27/2016 0.5      Eosinophils Absolute 07/27/2016 0.1      Basophils Absolute 07/27/2016 0.0          Diagnosis:  Past Medical History:   Diagnosis Date     Bipolar I disorder      Calcified granuloma of lung 2014     Obesity (BMI 35.0-39.9 without comorbidity)      Paranoid schizophrenia      Vitamin B 12 deficiency        Patient Active Problem List   Diagnosis     Mood disorder in conditions classified elsewhere     Left arm pain     Dandruff     Other dysfunctions of sleep stages or arousal from sleep     Weight gain     Back ache     Clinical depression     Chronic paranoid schizophrenia     Neuroleptic consent form discussed and signed: July 17, 2016           Schizoaffective disorder, chronic  Akathisia and movement disorder   High risk medication use        Treatment Plan:  1. The patient was provided with education and detailed written and verbal instructions in native language on diagnosis, future follow-up, and treatment plan, same instructions were also provided in English language.   2. Instructed to return to clinic in  2-3 months or sooner if needed, due to decrease in Prolixin.  3. Nurse only clinic is available in the interim if needed.  4. Crisis plan in place.  5. Referral to a culturally sensitive Russian speaking therapist Dr. Albarran was provided.    6.  Decrease  Prolixin 2.5 mg twice daily per patient's request   7. Close monitoring for side effects and recurrence of symptoms  8.  Continue outpatient support groups as previously ordered for stability and prevention of exacerbations, as they tend to be quite severe  for Waylon, leading to commitments and hospitalizations      Complex visit, multiple issues were addressed as reflected above, total of 16 issues, total time 40 minutes more than 50% coordination of care, family conference, collateral information, education, counseling, Greek cultural issues, review of written communication from the patient's mother, and clinic records and multiple chart and Epic  entries in preparation and generation of pertinent documentation, generation of multiple Epic entries and visit related orders, other details are fully reflected in the procedures paragraph. Please see associated nursing records for other details.         This note was created by undersigned using a Dragon dictation system. All typing errors or contextual distortion are unintentional and software inherent.     Sue Johnson MD

## 2021-06-17 NOTE — PROGRESS NOTES
Mental Health Processing Group    Date of Service: 04/24/2018 Start Time: 2:00 Stop Time: 3:00 Total Time: 60 minutes  Frequency: Wednesdays    CPT: 88664    Care Provider: Petty Storm MA, [facility]  N=  2    [employer] interpretor was used for this session.     Subjective: The patient was seen today for a 60 minute group psychotherapy session held at [facility].     Mental Status Exam:    Patient was pleasant and cooperative. The patient was oriented X4. The patient made good eye contact and was well dressed with good grooming and hygiene. Recent and remote memories were intact. Concentration and focus was normal. Speech (tone, volume, and rate) were intact. Mood and affect were congruently anxious but appropriate for the content of the session. Fund of knowledge was normal. Thought process was mostly logical and linear. Insight and judgment were intact. Patient denied SI, plan and/or means. Pt continues to be motivated for treatment.     Objective:    Patient was able to participate and benefit from treatment as evidenced by the patient s verbal expression and understanding of idea discussed.     New Symptoms or Complaints: The patient did not disclose any new symptoms to the undersigned.     Reason Patient is participating in the group: The group session was necessary for the care of the patient to address how symptoms impact the patient s mental health, relationships and overall well-being.      Patient's response to current intervention: Patient was receptive of feedback given, supportive of other group members and appears to have benefited from the group process. No barriers to learning evidenced.      Progress toward short-term goals: The patient completed the assigned homework which was to assess maladaptive thinking and its connections to the patient s pervious and/or current behaviors.     Patient's Impressions: The patient indicated readiness to learn by the patient s choice to attend  group.     Review of long term goals: See treatment plan developed by outpatient counselor.    Are there any new goals: There were no new goals brought to the undersigned s attention.      The patient completed today s group session for the processing of Mental Health Symptoms and its relationship to the patient s Chemical Dependency. Group content and interventions performed in this session: Check-in, the connection between thoughts, feelings, and behaviors. Group members talked about when they first noticed mental health in his life and working towards goals.     Patient education on the above topics was provided during this session. The patient was given an opportunity to ask questions and participate in the group discussion. The patient exhibited individual understanding by asking relevant questions and making appropriate comments to other group members.    Patient indicated upon onset of group that his anxiety was a 2 out of 10. Patient reported he felt good.     Assessment:  1. Schizoaffective disorder, bipolar type      No indication of SI, plan and/or means.     Providers Impression of Current Status:   Patient participated actively in today's group therapy session and appears to have learned the impact of one s thinking on feelings and behaviors. Patient will continue to benefit from ongoing psychotherapy that uses CBT and MI.     Psychotherapeutic Techniques: A cognitive behavioral modality was used.     PLAN:    Follow-up: Continue to attend groups to address mental health symptom(s)     Discharge Criteria Planning: Alleviation of mental health symptoms.     Encounter performed and documented by Petty Storm MA, [facility]

## 2021-06-17 NOTE — PROGRESS NOTES
Mental Health Processing Group    Date of Service: 05/9/2018 Start Time: 2:00 Stop Time: 3:00 Total Time: 60 minutes  Frequency: Wednesdays    CPT: 80541    Care Provider: Petty Storm MA, [facility]  N=  5    [employer] interpretor was used for this session.     Subjective: The patient was seen today for a 60 minute group psychotherapy session held at [facility].     Mental Status Exam:    Patient was pleasant and cooperative. The patient was oriented X4. The patient made good eye contact and was well dressed with good grooming and hygiene. Recent and remote memories were intact. Concentration and focus was normal. Speech (tone, volume, and rate) were intact. Mood and affect were congruently anxious but appropriate for the content of the session. Fund of knowledge was normal. Thought process was mostly logical and linear. Insight and judgment were intact. Patient denied SI, plan and/or means. Pt continues to be motivated for treatment.     Objective:    Patient was able to participate and benefit from treatment as evidenced by the patient s verbal expression and understanding of idea discussed.     New Symptoms or Complaints: The patient did not disclose any new symptoms to the undersigned.     Reason Patient is participating in the group: The group session was necessary for the care of the patient to address how symptoms impact the patient s mental health, relationships and overall well-being.      Patient's response to current intervention: Patient was receptive of feedback given, supportive of other group members and appears to have benefited from the group process. No barriers to learning evidenced.      Progress toward short-term goals: The patient completed the assigned homework which was to assess maladaptive thinking and its connections to the patient s pervious and/or current behaviors.     Patient's Impressions: The patient indicated readiness to learn by the patient s choice to attend  group.     Review of long term goals: See treatment plan developed by outpatient counselor.    Are there any new goals: There were no new goals brought to the undersigned s attention.      The patient completed today s group session for the processing of Mental Health Symptoms and its relationship to the patient s Chemical Dependency. Group content and interventions performed in this session: Check-in, the connection between thoughts, feelings, and behaviors. Group members talked about ways to handle stress and relationship conflicts.     Patient education on the above topics was provided during this session. The patient was given an opportunity to ask questions and participate in the group discussion. The patient exhibited individual understanding by asking relevant questions and making appropriate comments to other group members.    Patient indicated upon onset of group that his anxiety was a 3 out of 10. Patient reported he felt okay.     Assessment:  1. Schizoaffective disorder, bipolar type      No indication of SI, plan and/or means.     Providers Impression of Current Status:   Patient participated actively in today's group therapy session and appears to have learned the impact of one s thinking on feelings and behaviors. Patient will continue to benefit from ongoing psychotherapy that uses CBT and MI.     Psychotherapeutic Techniques: A cognitive behavioral modality was used.     PLAN:    Follow-up: Continue to attend groups to address mental health symptom(s)     Discharge Criteria Planning: Alleviation of mental health symptoms.     Encounter performed and documented by Petty Storm MA, [facility]

## 2021-06-17 NOTE — PROGRESS NOTES
Mental Health Processing Group    Date of Service: 04/25/2018 Start Time: 2:00 Stop Time: 3:00 Total Time: 60 minutes  Frequency: Wednesdays    CPT: 09453    Care Provider: Petty Storm MA, [facility]  N=  4    [employer] interpretor was used for this session    Subjective: The patient was seen today for a 60 minute group psychotherapy session held at [facility].     Mental Status Exam:    Patient was pleasant and cooperative. The patient was oriented X4. The patient made good eye contact and was well dressed with good grooming and hygiene. Recent and remote memories were intact. Concentration and focus was normal. Speech (tone, volume, and rate) were intact. Mood and affect were congruently anxious but appropriate for the content of the session. Fund of knowledge was normal. Thought process was mostly logical and linear. Insight and judgment were intact. Patient denied SI, plan and/or means. Pt continues to be motivated for treatment.     Objective:    Patient was able to participate and benefit from treatment as evidenced by the patient s verbal expression and understanding of idea discussed.     New Symptoms or Complaints: The patient did not disclose any new symptoms to the undersigned.     Reason Patient is participating in the group: The group session was necessary for the care of the patient to address how symptoms impact the patient s mental health, relationships and overall well-being.      Patient's response to current intervention: Patient was receptive of feedback given, supportive of other group members and appears to have benefited from the group process. No barriers to learning evidenced.      Progress toward short-term goals: The patient completed the assigned homework which was to assess maladaptive thinking and its connections to the patient s pervious and/or current behaviors.     Patient's Impressions: The patient indicated readiness to learn by the patient s choice to attend  group.     Review of long term goals: See treatment plan developed by outpatient counselor.    Are there any new goals: There were no new goals brought to the undersigned s attention.      The patient completed today s group session for the processing of Mental Health Symptoms and its relationship to the patient s Chemical Dependency. Group content and interventions performed in this session: Check-in, the connection between thoughts, feelings, and behaviors. Group members talked about test anxiety and trying to look at positive ways to move forward.     Patient education on the above topics was provided during this session. The patient was given an opportunity to ask questions and participate in the group discussion. The patient exhibited individual understanding by asking relevant questions and making appropriate comments to other group members.    Patient indicated upon onset of group that his anxiety was a 3 out of 10. Patient reported he felt okay.     Assessment:  1. Schizoaffective disorder, bipolar type      No indication of SI, plan and/or means.     Providers Impression of Current Status:   Patient participated actively in today's group therapy session and appears to have learned the impact of one s thinking on feelings and behaviors. Patient will continue to benefit from ongoing psychotherapy that uses CBT and MI.     Psychotherapeutic Techniques: A cognitive behavioral modality was used.     PLAN:    Follow-up: Continue to attend groups to address mental health symptom(s)     Discharge Criteria Planning: Alleviation of mental health symptoms.     Encounter performed and documented by Petty Storm MA, [facility]

## 2021-06-17 NOTE — PROGRESS NOTES
Pt is here for culturally sensitive psychiatric med management follow up. He is accompanied by his mother today. Client has no new concerns, says he does not take NAC for TD. His mother is wondering if Dr. Johnson can decrease Prolixin.     Correct pharmacy verified with patient and confirmed in snapshot? [x] yes []no    Charge captured ? [x] yes  [] no    Medications Phoned  to Pharmacy [] yes [x]no  Name of Pharmacist:  List Medications, including dose, quantity and instructions      Medication Prescriptions given to patient   [] yes  [x] no   List the name of the drug the prescription was written for.       Medications ordered this visit were e-scribed.  Verified by order class [x] yes  [] no  Prolixin 2.5   Medication changes or discontinuations were communicated to patient's pharmacy: [x] yes  [] no  Called [facility] and discontinued Prolixin 5 mg BID  UA collected [] yes  [x] no    [place] Prescription Monitoring Program Reviewed? [] yes  [x] no    Referrals were made to:  none    Future appointment was made: [x] yes  [] no  6/19/18  Dictation completed at time of chart check: [x] yes  [] no    I have checked the documentation for today s encounters and the above information has been reviewed and completed.

## 2021-06-17 NOTE — PROGRESS NOTES
Mental Health Processing Group    Date of Service: 04/4/2018 Start Time: 2:00 Stop Time: 3:00 Total Time: 60 minutes  Frequency: Wednesdays    CPT: 37594    Care Provider: Petty Storm MA, [facility]  N=  4    [employer] interpretor was used for this session.     Subjective: The patient was seen today for a 60 minute group psychotherapy session held at [facility].     Mental Status Exam:    Patient was pleasant and cooperative. The patient was oriented X4. The patient made good eye contact and was well dressed with good grooming and hygiene. Recent and remote memories were intact. Concentration and focus was normal. Speech (tone, volume, and rate) were intact. Mood and affect were congruently anxious but appropriate for the content of the session. Fund of knowledge was normal. Thought process was mostly logical and linear. Insight and judgment were intact. Patient denied SI, plan and/or means. Pt continues to be motivated for treatment.     Objective:    Patient was able to participate and benefit from treatment as evidenced by the patient s verbal expression and understanding of idea discussed.     New Symptoms or Complaints: The patient did not disclose any new symptoms to the undersigned.     Reason Patient is participating in the group: The group session was necessary for the care of the patient to address how symptoms impact the patient s mental health, relationships and overall well-being.      Patient's response to current intervention: Patient was receptive of feedback given, supportive of other group members and appears to have benefited from the group process. No barriers to learning evidenced.      Progress toward short-term goals: The patient completed the assigned homework which was to assess maladaptive thinking and its connections to the patient s pervious and/or current behaviors.     Patient's Impressions: The patient indicated readiness to learn by the patient s choice to attend  group.     Review of long term goals: See treatment plan developed by outpatient counselor.    Are there any new goals: There were no new goals brought to the undersigned s attention.      The patient completed today s group session for the processing of Mental Health Symptoms and its relationship to the patient s Chemical Dependency. Group content and interventions performed in this session: Check-in, the connection between thoughts, feelings, and behaviors. Group members talked about handling stress caused by other people and finding reta in life.     Patient education on the above topics was provided during this session. The patient was given an opportunity to ask questions and participate in the group discussion. The patient exhibited individual understanding by asking relevant questions and making appropriate comments to other group members.    Patient indicated upon onset of group that his anxiety was a 2 out of 10. Patient reported he felt okay.     Assessment:  1. Schizoaffective disorder, bipolar type      No indication of SI, plan and/or means.     Providers Impression of Current Status:   Patient participated actively in today's group therapy session and appears to have learned the impact of one s thinking on feelings and behaviors. Patient will continue to benefit from ongoing psychotherapy that uses CBT and MI.     Psychotherapeutic Techniques: A cognitive behavioral modality was used.     PLAN:    Follow-up: Continue to attend groups to address mental health symptom(s)     Discharge Criteria Planning: Alleviation of mental health symptoms.     Encounter performed and documented by Petty Storm MA, [facility]

## 2021-06-17 NOTE — PROGRESS NOTES
Mental Health Processing Group    Date of Service: 04/11/2018 Start Time: 2:00 Stop Time: 3:00 Total Time: 60 minutes  Frequency: Wednesdays    CPT: [id number]    Care Provider: Petty Storm MA, [facility]  N=  3    Subjective: The patient was seen today for a 60 minute group psychotherapy session held at [facility].     Mental Status Exam:    Patient was pleasant and cooperative. The patient was oriented X4. The patient made good eye contact and was well dressed with good grooming and hygiene. Recent and remote memories were intact. Concentration and focus was normal. Speech (tone, volume, and rate) were intact. Mood and affect were congruently anxious but appropriate for the content of the session. Fund of knowledge was normal. Thought process was mostly logical and linear. Insight and judgment were intact. Patient denied SI, plan and/or means. Pt continues to be motivated for treatment.     Objective:    Patient was able to participate and benefit from treatment as evidenced by the patient s verbal expression and understanding of idea discussed.     New Symptoms or Complaints: The patient did not disclose any new symptoms to the undersigned.     Reason Patient is participating in the group: The group session was necessary for the care of the patient to address how symptoms impact the patient s mental health, relationships and overall well-being.      Patient's response to current intervention: Patient was receptive of feedback given, supportive of other group members and appears to have benefited from the group process. No barriers to learning evidenced.      Progress toward short-term goals: The patient completed the assigned homework which was to assess maladaptive thinking and its connections to the patient s pervious and/or current behaviors.     Patient's Impressions: The patient indicated readiness to learn by the patient s choice to attend group.     Review of long term goals: See treatment  plan developed by outpatient counselor.    Are there any new goals: There were no new goals brought to the undersigned s attention.      The patient completed today s group session for the processing of Mental Health Symptoms and its relationship to the patient s Chemical Dependency. Group content and interventions performed in this session: Check-in, the connection between thoughts, feelings, and behaviors. Group members talked about continued high levels of stress and ways they are handling anger.     Patient education on the above topics was provided during this session. The patient was given an opportunity to ask questions and participate in the group discussion. The patient exhibited individual understanding by asking relevant questions and making appropriate comments to other group members.    Patient indicated upon onset of group that his anxiety was a 2 out of 10. Patient reported he felt okay.     Assessment:  1. Schizoaffective disorder, bipolar type      No indication of SI, plan and/or means.     Providers Impression of Current Status:   Patient participated actively in today's group therapy session and appears to have learned the impact of one s thinking on feelings and behaviors. Patient will continue to benefit from ongoing psychotherapy that uses CBT and MI.     Psychotherapeutic Techniques: A cognitive behavioral modality was used.     PLAN:    Follow-up: Continue to attend groups to address mental health symptom(s)     Discharge Criteria Planning: Alleviation of mental health symptoms.     Encounter performed and documented by Petty Storm MA, [facility]

## 2021-06-17 NOTE — PROGRESS NOTES
Mental Health Processing Group    Date of Service: 03/28/2018 Start Time: 2:00 Stop Time: 3:00 Total Time: 60 minutes  Frequency: Wednesdays    CPT: [id number]    Care Provider: Petty Storm MA, [facility]  N=  3    Subjective: The patient was seen today for a 60 minute group psychotherapy session held at [facility].     Mental Status Exam:    Patient was pleasant and cooperative. The patient was oriented X4. The patient made good eye contact and was well dressed with good grooming and hygiene. Recent and remote memories were intact. Concentration and focus was normal. Speech (tone, volume, and rate) were intact. Mood and affect were congruently anxious but appropriate for the content of the session. Fund of knowledge was normal. Thought process was mostly logical and linear. Insight and judgment were intact. Patient denied SI, plan and/or means. Pt continues to be motivated for treatment.     Objective:    Patient was able to participate and benefit from treatment as evidenced by the patient s verbal expression and understanding of idea discussed.     New Symptoms or Complaints: The patient did not disclose any new symptoms to the undersigned.     Reason Patient is participating in the group: The group session was necessary for the care of the patient to address how symptoms impact the patient s mental health, relationships and overall well-being.      Patient's response to current intervention: Patient was receptive of feedback given, supportive of other group members and appears to have benefited from the group process. No barriers to learning evidenced.      Progress toward short-term goals: The patient completed the assigned homework which was to assess maladaptive thinking and its connections to the patient s pervious and/or current behaviors.     Patient's Impressions: The patient indicated readiness to learn by the patient s choice to attend group.     Review of long term goals: See treatment  plan developed by outpatient counselor.    Are there any new goals: There were no new goals brought to the undersigned s attention.      The patient completed today s group session for the processing of Mental Health Symptoms and its relationship to the patient s Chemical Dependency. Group content and interventions performed in this session: Check-in, the connection between thoughts, feelings, and behaviors. Group members talked about staying in the moment.     Patient education on the above topics was provided during this session. The patient was given an opportunity to ask questions and participate in the group discussion. The patient exhibited individual understanding by asking relevant questions and making appropriate comments to other group members.    Patient indicated upon onset of group that his anxiety was a 2 out of 10. Patient reported he felt good.     Assessment:  1. Schizoaffective disorder, bipolar type      No indication of SI, plan and/or means.     Providers Impression of Current Status:   Patient participated actively in today's group therapy session and appears to have learned the impact of one s thinking on feelings and behaviors. Patient will continue to benefit from ongoing psychotherapy that uses CBT and MI.     Psychotherapeutic Techniques: A cognitive behavioral modality was used.     PLAN:    Follow-up: Continue to attend groups to address mental health symptom(s)     Discharge Criteria Planning: Alleviation of mental health symptoms.     Encounter performed and documented by [facility], [facility]

## 2021-06-18 NOTE — PROGRESS NOTES
Mental Health Processing Group    Date of Service: 06/6/2018 Start Time: 2:00 Stop Time: 3:00 Total Time: 60 minutes  Frequency: Wednesdays    CPT: [id number]    Care Provider: Petty Storm MA, [facility]  N=  4    Subjective: The patient was seen today for a 60 minute group psychotherapy session held at [facility].     Mental Status Exam:    Patient was pleasant and cooperative. The patient was oriented X4. The patient made good eye contact and was well dressed with good grooming and hygiene. Recent and remote memories were intact. Concentration and focus was normal. Speech (tone, volume, and rate) were intact. Mood and affect were congruently tired but appropriate for the content of the session. Fund of knowledge was normal. Thought process was mostly logical and linear. Insight and judgment were intact. Patient denied SI, plan and/or means. Pt continues to be motivated for treatment.     Objective:    Patient was able to participate and benefit from treatment as evidenced by the patient s verbal expression and understanding of idea discussed.     New Symptoms or Complaints: The patient did not disclose any new symptoms to the undersigned.     Reason Patient is participating in the group: The group session was necessary for the care of the patient to address how symptoms impact the patient s mental health, relationships and overall well-being.      Patient's response to current intervention: Patient was receptive of feedback given, supportive of other group members and appears to have benefited from the group process. No barriers to learning evidenced.      Progress toward short-term goals: The patient completed the assigned homework which was to assess maladaptive thinking and its connections to the patient s pervious and/or current behaviors.     Patient's Impressions: The patient indicated readiness to learn by the patient s choice to attend group.     Review of long term goals: See treatment plan  developed by outpatient counselor.    Are there any new goals: There were no new goals brought to the undersigned s attention.      The patient completed today s group session for the processing of Mental Health Symptoms and its relationship to the patient s Chemical Dependency. Group content and interventions performed in this session: Check-in, the connection between thoughts, feelings, and behaviors. Group members talked about struggle with change and staying positive.     Patient education on the above topics was provided during this session. The patient was given an opportunity to ask questions and participate in the group discussion. The patient exhibited individual understanding by asking relevant questions and making appropriate comments to other group members.    Patient indicated upon onset of group that his anxiety was a 2 out of 10. Patient reported he felt tired due to working now.     Assessment:  1. Schizoaffective disorder, bipolar type      No indication of SI, plan and/or means.     Providers Impression of Current Status:   Patient participated actively in today's group therapy session and appears to have learned the impact of one s thinking on feelings and behaviors. Patient will continue to benefit from ongoing psychotherapy that uses CBT and MI.     Psychotherapeutic Techniques: A cognitive behavioral modality was used.     PLAN:    Follow-up: Continue to attend groups to address mental health symptom(s)     Discharge Criteria Planning: Alleviation of mental health symptoms.     Encounter performed and [facility], [facility]

## 2021-06-18 NOTE — PROGRESS NOTES
Pt is here for culturally sensitive psychiatric med management follow up. Client is accompanied by his mother today. He reports stable mood. Mother is complaining of increased appetite and him being awake all night then sleeping a lot during day time.     Correct pharmacy verified with patient and confirmed in snapshot? [x] yes []no    Charge captured ? [x] yes  [] no    Medications Phoned  to Pharmacy [] yes [x]no  Name of Pharmacist:  List Medications, including dose, quantity and instructions      Medication Prescriptions given to patient   [x] yes  [] no   List the name of the drug the prescription was written for.       Medications ordered this visit were e-scribed.  Verified by order class [x] yes  [] no  Prolixin 5 mg   Medication changes or discontinuations were communicated to patient's pharmacy: [x] yes  [] no  [facility] and updated of the Prolixin dose change to 5 mg Q HS. Prolixin 2.5 mg two times a day discontinued  UA collected [] yes  [x] no    [place] Prescription Monitoring Program Reviewed? [] yes  [x] no    Referrals were made to:  PMD for obesity treatment  - Spoke to his mother, they already had nutritional concult at [facility] sometime ago, pt hasn't been following their recommendations.    LM for pt updating of Dr. Johnson directive to follow up with PMD for weight loss.     Future appointment was made: [x] yes  [] no  9/18/18  Dictation completed at time of chart check: [x] yes  [] no    I have checked the documentation for today s encounters and the above information has been reviewed and completed.

## 2021-06-18 NOTE — PROGRESS NOTES
Mental Health Processing Group    Date of Service: 05/16/2018 Start Time: 2:00 Stop Time: 3:00 Total Time: 60 minutes  Frequency: Wednesdays    CPT: [id number]    Care Provider: Petty Storm MA, [facility]  N=  4    [employer] interpretor was used for this session.     Subjective: The patient was seen today for a 60 minute group psychotherapy session held at [facility].     Mental Status Exam:    Patient was pleasant and cooperative. The patient was oriented X4. The patient made good eye contact and was well dressed with good grooming and hygiene. Recent and remote memories were intact. Concentration and focus was normal. Speech (tone, volume, and rate) were intact. Mood and affect were congruently down but appropriate for the content of the session. Fund of knowledge was normal. Thought process was mostly logical and linear. Insight and judgment were intact. Patient denied SI, plan and/or means. Pt continues to be motivated for treatment.     Objective:    Patient was able to participate and benefit from treatment as evidenced by the patient s verbal expression and understanding of idea discussed.     New Symptoms or Complaints: The patient did not disclose any new symptoms to the undersigned.     Reason Patient is participating in the group: The group session was necessary for the care of the patient to address how symptoms impact the patient s mental health, relationships and overall well-being.      Patient's response to current intervention: Patient was receptive of feedback given, supportive of other group members and appears to have benefited from the group process. No barriers to learning evidenced.      Progress toward short-term goals: The patient completed the assigned homework which was to assess maladaptive thinking and its connections to the patient s pervious and/or current behaviors.     Patient's Impressions: The patient indicated readiness to learn by the patient s choice to attend  group.     Review of long term goals: See treatment plan developed by outpatient counselor.    Are there any new goals: There were no new goals brought to the undersigned s attention.      The patient completed today s group session for the processing of Mental Health Symptoms and its relationship to the patient s Chemical Dependency. Group content and interventions performed in this session: Check-in, the connection between thoughts, feelings, and behaviors. Group members talked about staying in the moment and trying to find a job.     Patient education on the above topics was provided during this session. The patient was given an opportunity to ask questions and participate in the group discussion. The patient exhibited individual understanding by asking relevant questions and making appropriate comments to other group members.    Patient indicated upon onset of group that his anxiety was a 2 out of 10. Patient reported he felt okay.     Assessment:  1. Schizoaffective disorder, bipolar type      No indication of SI, plan and/or means.     Providers Impression of Current Status:   Patient participated actively in today's group therapy session and appears to have learned the impact of one s thinking on feelings and behaviors. Patient will continue to benefit from ongoing psychotherapy that uses CBT and MI.     Psychotherapeutic Techniques: A cognitive behavioral modality was used.     PLAN:    Follow-up: Continue to attend groups to address mental health symptom(s)     Discharge Criteria Planning: Alleviation of mental health symptoms.     Encounter performed and documented by Petty Storm MA, [facility]

## 2021-06-18 NOTE — PROGRESS NOTES
Mental Health Processing Group    Date of Service: 05/23/2018 Start Time: 2:00 Stop Time: 3:00 Total Time: 60 minutes  Frequency: Wednesdays    CPT: 13138    Care Provider: Petty Storm MA, [facility]  N=  3    Subjective: The patient was seen today for a 60 minute group psychotherapy session held at [facility].     Mental Status Exam:    Patient was pleasant and cooperative. The patient was oriented X4. The patient made good eye contact and was well dressed with good grooming and hygiene. Recent and remote memories were intact. Concentration and focus was normal. Speech (tone, volume, and rate) were intact. Mood and affect were congruently happy but appropriate for the content of the session. Fund of knowledge was normal. Thought process was mostly logical and linear. Insight and judgment were intact. Patient denied SI, plan and/or means. Pt continues to be motivated for treatment.     Objective:    Patient was able to participate and benefit from treatment as evidenced by the patient s verbal expression and understanding of idea discussed.     New Symptoms or Complaints: The patient did not disclose any new symptoms to the undersigned.     Reason Patient is participating in the group: The group session was necessary for the care of the patient to address how symptoms impact the patient s mental health, relationships and overall well-being.      Patient's response to current intervention: Patient was receptive of feedback given, supportive of other group members and appears to have benefited from the group process. No barriers to learning evidenced.      Progress toward short-term goals: The patient completed the assigned homework which was to assess maladaptive thinking and its connections to the patient s pervious and/or current behaviors.     Patient's Impressions: The patient indicated readiness to learn by the patient s choice to attend group.     Review of long term goals: See treatment plan  developed by outpatient counselor.    Are there any new goals: There were no new goals brought to the undersigned s attention.      The patient completed today s group session for the processing of Mental Health Symptoms and its relationship to the patient s Chemical Dependency. Group content and interventions performed in this session: Check-in, the connection between thoughts, feelings, and behaviors. Group members talked about sleep hygiene and self-care.     Patient education on the above topics was provided during this session. The patient was given an opportunity to ask questions and participate in the group discussion. The patient exhibited individual understanding by asking relevant questions and making appropriate comments to other group members.    Patient indicated upon onset of group that his anxiety was a 2 out of 10. Patient reported he felt happy.    Assessment:  1. Schizoaffective disorder, bipolar type      No indication of SI, plan and/or means.     Providers Impression of Current Status:   Patient participated actively in today's group therapy session and appears to have learned the impact of one s thinking on feelings and behaviors. Patient will continue to benefit from ongoing psychotherapy that uses CBT and MI.     Psychotherapeutic Techniques: A cognitive behavioral modality was used.     PLAN:    Follow-up: Continue to attend groups to address mental health symptom(s)     Discharge Criteria Planning: Alleviation of mental health symptoms.     Encounter performed and documented by [facility] Storm MA, [facility]

## 2021-06-18 NOTE — PROGRESS NOTES
OUTPATIENT PROGRESS NOTE      Date of visit June 19, 2018    Norwegian name  Waylon Murdock  Mother's name Ilene Nichole    Reasons For Visit Are Multiple Today:   1.  Culturally sensitive follow-up for mental health issues  2.  Involuntary rocking  movements of the torso dyskinesia, resolved  3.  Follow-up for schizoaffective disorder, no symptoms of psychosis at this time  4.  Follow-up for david, no symptoms of david at this time  5.  High risk medication use: The patient is on Prolixin  6.   Ophthalmology follow-up: Reviewed, discussed, submitted into a.  7.  Norwegian cultural issues: The interview is conducted in Norwegian language  8.  Education on current medication regimen  9.  Education on future follow-up appointments  10.  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 High risk medication use: assessment of movements, discus  score 2, which is stable and improvement from previous score of 4  12.  Family conference: The patient is accompanied by his mother who was present during interview per patient's request  13.  Collateral information: Obtained from the patient's mother  14.  Concerned with weight gain: We discussed healthy eating habits, role of structure and exercise, potential future complications of excessive weight.  15.  Request to decrease the dose of Prolixin.  16.  Education on risk of recurrence of  symptoms with decreased dose of antipsychotic  17.  Safety issues: Explicitly discussed as the patient is helping at the construction site, explicit education was provided on his limitations with cognitive function and coordination and execution due to baseline schizoaffective disorder, recommended light due to his, no fernandez, just supportive functions    History of present illness/Subjective:  The patient is Norwegian speaking. The interview is conducted in Norwegian language, translated personally by me into English records  which adds additional element of complexity to this visit and my assessment. multiple questions and issues were addressed as reflected above.  Treatment plan was discussed in details.  patient is calm, cooperative. No behavioral or stated evidence of   excessive activation.  No behavioral or stated evidence of symptoms of david or psychosis or other mood symptoms /behaviors.  Denying SI/HI/voices/visions/telepathic communication/imaginary girlfriends/thoughts of cutting his thumb off.  Appears to be at baseline.  Compliant.  Appropriately engaging for his level of function.  Smiling appropriately.  Ongoing weight.  Interested to start meaningful long lasting relationships, interested in marriage.    Medications:      Current Outpatient Prescriptions   Medication Sig Dispense Refill     CYANOCOBALAMIN, VITAMIN B-12, (VITAMIN B12 ORAL) Take 500 mg by mouth daily.        fluPHENAZine (PROLIXIN) 5 MG tablet Take 1 tablet (5 mg total) by mouth at bedtime. 90 tablet 1     No current facility-administered medications for this visit.          Family history/Social history   Lives with his parents.  Unemployed on SSI.  Volunteers at [employer]          Procedures:  1. Coordination of care: nursing notes, vital signs, multiple records of communication between the patient and the clinic, communication with the pharmacy, and multiple medication orders were reviewed signed and discussed with the patient. Multiple chart entries were reviewed in preparation of documentation and generation of documentation.  2.  Education: was provided on diagnosis, medication regimen, psychotherapeutic treatment modalities, future follow-up appointments.  3.  Counseling: was provided on coping with mental illness.  4.  Collateral information was obtained from  mother  5.  Coordination of care: I  entered all orders and educated the patient as above  6.  Coordination of care: I personally coordinated all medication orders, follow up orders, pharmacy  "requests, and provided the patient with detailed instructions  in his native Kosovan language  7. Kosovan cultural and immigration issues were addressed, the interview was conducted in Kosovan language, Kosovan medications were discussed as it is common in Kosovan speaking community to take Russian produced medications which could be dangerous. The patient takes  none at this time      Review Of Systems:  As above.   Hypersomnia.  The reminder of 10 systems was negative.      Vital Signs:    /82 (Patient Site: Right Arm, Patient Position: Sitting, Cuff Size: Adult Large)  Pulse 73  Temp 98  F (36.7  C) (Oral)   Ht 5' 6\" (1.676 m)  Wt (!) 259 lb (117.5 kg)  BMI 41.8 kg/m2    Mental Status Examination:     Appearance  calm    Alert and oriented ×3    Attention and concentration  normal    Speech  fluent    Mood  good    Affect  appropriate    Thought processing  logical    Associations  normal    Thought content  no SI/HI/psychosis    Language  normal in Kosovan    Short term memory  normal    Long term memory  normal    Fund of knowledge  normal    Psychomotor activity  normal    Gait and station  normal    Insight and judgment  normal        Limited Physical Examination:  Was performed  due to restlessness and akathisia related to Prolixin.  Rocking body movements are improved.     Overweight.      Laboratory Data:    personally reviewed.   No visits with results within 2 Month(s) from this visit.  Latest known visit with results is:    Admission on 07/16/2016, Discharged on 08/18/2016   Component Date Value     Bilirubin, Total 07/27/2016 0.4      Bilirubin, Direct 07/27/2016 0.1      Protein, Total 07/27/2016 7.6      Albumin 07/27/2016 3.9      Alkaline Phosphatase 07/27/2016 91      AST 07/27/2016 20      ALT 07/27/2016 48*     WBC 07/27/2016 7.8      RBC 07/27/2016 5.11      Hemoglobin 07/27/2016 14.7      Hematocrit 07/27/2016 44.3      MCV 07/27/2016 87      MCH 07/27/2016 28.8      MCHC 07/27/2016 " 33.2      RDW 07/27/2016 12.5      Platelets 07/27/2016 291      MPV 07/27/2016 10.1      Neutrophils % 07/27/2016 63      Lymphocytes % 07/27/2016 29      Monocytes % 07/27/2016 6      Eosinophils % 07/27/2016 2      Basophils % 07/27/2016 1      Neutrophils Absolute 07/27/2016 4.9      Lymphocytes Absolute 07/27/2016 2.3      Monocytes Absolute 07/27/2016 0.5      Eosinophils Absolute 07/27/2016 0.1      Basophils Absolute 07/27/2016 0.0          Diagnosis:  Past Medical History:   Diagnosis Date     Bipolar I disorder (H)      Calcified granuloma of lung (H) 2014     Obesity (BMI 35.0-39.9 without comorbidity)      Paranoid schizophrenia (H)      Vitamin B 12 deficiency        Patient Active Problem List   Diagnosis     Mood disorder in conditions classified elsewhere     Left arm pain     Dandruff     Other dysfunctions of sleep stages or arousal from sleep     Weight gain     Back ache     Clinical depression     Chronic paranoid schizophrenia (H)     Neuroleptic consent form discussed and signed: July 17, 2016           Schizoaffective disorder, chronic  Akathisia and movement disorder, resolved  High risk medication use   morbid obesity      Treatment Plan:  1. The patient was provided with education and detailed written and verbal instructions in native language on diagnosis, future follow-up, and treatment plan, same instructions were also provided in English language.   2. Instructed to return to clinic in  3 months or sooner if needed, due to decrease in Prolixin.  3. Nurse only clinic is available in the interim if needed.  4. Crisis plan in place.  5. Referral to a culturally sensitive Russian speaking therapist Dr. Albarran was provided.    6.  Change   Prolixin  5 mg QHS, to simplify the regimen and to reduce side effect /sedation during day   7. Close monitoring for side effects and recurrence of symptoms  8.  Continue medications as prescribed and outpatient support groups as previously ordered for  stability and prevention of exacerbations, as they tend to be quite severe for Waylon, leading to commitments and hospitalizations   9.  Consider follow-up with primary physician for Victoza or metformin or other  pharmacological treatment of obesity    Complex visit, multiple issues were addressed as reflected above, total of 16 issues, total time 40 minutes more than 50% coordination of care, family conference, collateral information, a lot of education today as reflected above, counseling, [place]an cultural issues, review of ophthalmology clinic records and multiple chart and Epic  entries in preparation and generation of pertinent documentation, generation of multiple Epic entries and visit related orders, other details are fully reflected in the procedures paragraph. Please see associated nursing records for other details.         This note was created by andres using a Dragon dictation system. All typing errors or contextual distortion are unintentional and software inherent.     Sue Johnson MD

## 2021-06-18 NOTE — PROGRESS NOTES
Mental Health Processing Group    Date of Service: 05/30/2018 Start Time: 2:00 Stop Time: 3:00 Total Time: 60 minutes  Frequency: Wednesdays    CPT: 98616    Care Provider: Petty Storm MA, [facility]  N=  3    [employer] interpretor was used for this session.     Subjective: The patient was seen today for a 60 minute group psychotherapy session held at [facility].     Mental Status Exam:    Patient was pleasant and cooperative. The patient was oriented X4. The patient made good eye contact and was well dressed with good grooming and hygiene. Recent and remote memories were intact. Concentration and focus was normal. Speech (tone, volume, and rate) were intact. Mood and affect were congruently down but appropriate for the content of the session. Fund of knowledge was normal. Thought process was mostly logical and linear. Insight and judgment were intact. Patient denied SI, plan and/or means. Pt continues to be motivated for treatment.     Objective:    Patient was able to participate and benefit from treatment as evidenced by the patient s verbal expression and understanding of idea discussed.     New Symptoms or Complaints: The patient did not disclose any new symptoms to the undersigned.     Reason Patient is participating in the group: The group session was necessary for the care of the patient to address how symptoms impact the patient s mental health, relationships and overall well-being.      Patient's response to current intervention: Patient was receptive of feedback given, supportive of other group members and appears to have benefited from the group process. No barriers to learning evidenced.      Progress toward short-term goals: The patient completed the assigned homework which was to assess maladaptive thinking and its connections to the patient s pervious and/or current behaviors.     Patient's Impressions: The patient indicated readiness to learn by the patient s choice to attend  group.     Review of long term goals: See treatment plan developed by outpatient counselor.    Are there any new goals: There were no new goals brought to the undersigned s attention.      The patient completed today s group session for the processing of Mental Health Symptoms and its relationship to the patient s Chemical Dependency. Group content and interventions performed in this session: Check-in, the connection between thoughts, feelings, and behaviors. Group members talked about  finding reta day to day and looking at long term goals.     Patient education on the above topics was provided during this session. The patient was given an opportunity to ask questions and participate in the group discussion. The patient exhibited individual understanding by asking relevant questions and making appropriate comments to other group members.    Patient indicated upon onset of group that his anxiety was a 1 out of 10. Patient reported he felt good.     Assessment:  1. Schizoaffective disorder, bipolar type      No indication of SI, plan and/or means.     Providers Impression of Current Status:   Patient participated actively in today's group therapy session and appears to have learned the impact of one s thinking on feelings and behaviors. Patient will continue to benefit from ongoing psychotherapy that uses CBT and MI.     Psychotherapeutic Techniques: A cognitive behavioral modality was used.     PLAN:    Follow-up: Continue to attend groups to address mental health symptom(s)     Discharge Criteria Planning: Alleviation of mental health symptoms.     Encounter performed and documented by Petty Storm MA, [facility]

## 2021-06-19 NOTE — PROGRESS NOTES
Mental Health Processing Group    Date of Service: 07/25/2018 Start Time: 2:00 Stop Time: 3:00 Total Time: 60 minutes  Frequency: Wednesdays    CPT: 04693    Care Provider: Petty Storm MA, [facility]  N=  4    [facility]    Subjective: The patient was seen today for a 60 minute group psychotherapy session held at [facility].     Mental Status Exam:    Patient was pleasant and cooperative. The patient was oriented X4. The patient made good eye contact and was well dressed with good grooming and hygiene. Recent and remote memories were intact. Concentration and focus was within normal range.  Speech (tone, volume, and rate) were soft and concise. Mood and affect were congruently happy and appropriate for the content of the session. Fund of knowledge was normal. Thought process was mostly logical and linear. Insight and judgment were intact. Patient denied SI, plan and/or means. Pt continues to be motivated for treatment.     Objective:    Patient was able to participate and benefit from treatment as evidenced by the patient s verbal expression and understanding of idea discussed.     New Symptoms or Complaints: The patient did not disclose any new symptoms to the undersigned.     Reason Patient is participating in the group: The group session was necessary for the care of the patient to address how symptoms impact the patient s mental health, relationships and overall well-being.      Patient's response to current intervention: Patient was receptive of feedback given, supportive of other group members and appears to have benefited from the group process. No barriers to learning evidenced.      Progress toward short-term goals: The patient completed the assigned homework which was to assess maladaptive thinking and its connections to the patient s pervious and/or current behaviors.     Patient's Impressions: The patient indicated readiness to learn by the  patient s choice to attend group.     Review of long term goals: See treatment plan developed by outpatient counselor.    Are there any new goals: There were no new goals brought to the undersigned s attention.      The patient completed today s group session for the processing of Mental Health Symptoms and its relationship to the patient s Chemical Dependency. Group content and interventions performed in this session: Check-in, the connection between thoughts, feelings, and behaviors. Group members talked about goals to work om moving forward.     Patient education on the above topics was provided during this session. The patient was given an opportunity to ask questions and participate in the group discussion. The patient exhibited individual understanding by asking relevant questions and making appropriate comments to other group members.    Patient indicated upon onset of group that his anxiety was a 1 out of 10. Patient reported he felt happy.    Assessment:  1. Schizoaffective disorder, bipolar type, chronic, in remission      No indication of SI, plan and/or means.     Providers Impression of Current Status:   Patient participated actively in today's group therapy session and appears to have learned the impact of one s thinking on feelings and behaviors. Patient will continue to benefit from ongoing psychotherapy that uses CBT and MI.     Psychotherapeutic Techniques: A cognitive behavioral modality was used.     PLAN:    Follow-up: Continue to attend groups to address mental health symptom(s)     Discharge Criteria Planning: Alleviation of mental health symptoms.     Encounter performed and documented by Petty Storm MA, [facility]

## 2021-06-19 NOTE — PROGRESS NOTES
Mental Health Processing Group    Date of Service: 06/27/2018 Start Time: 2:00 Stop Time: 3:00 Total Time: 60 minutes  Frequency: Wednesdays    CPT: 08573    Care Provider: Petty Storm MA, [facility]  N=  4    [facility] for [facility]    Subjective: The patient was seen today for a 60 minute group psychotherapy session held at [facility].     Mental Status Exam:    Patient was pleasant and cooperative. The patient was oriented X4. The patient made good eye contact and was well dressed with good grooming and hygiene. Recent and remote memories were intact. Concentration and focus was within normal range.  Speech (tone, volume, and rate) were soft and concise. Mood and affect were congruently anxious but appropriate for the content of the session. Fund of knowledge was normal. Thought process was mostly logical and linear. Insight and judgment were intact. Patient denied SI, plan and/or means. Pt continues to be motivated for treatment.     Objective:    Patient was able to participate and benefit from treatment as evidenced by the patient s verbal expression and understanding of idea discussed.     New Symptoms or Complaints: The patient did not disclose any new symptoms to the undersigned.     Reason Patient is participating in the group: The group session was necessary for the care of the patient to address how symptoms impact the patient s mental health, relationships and overall well-being.      Patient's response to current intervention: Patient was receptive of feedback given, supportive of other group members and appears to have benefited from the group process. No barriers to learning evidenced.      Progress toward short-term goals: The patient completed the assigned homework which was to assess maladaptive thinking and its connections to the patient s pervious and/or current behaviors.     Patient's Impressions: The patient indicated readiness to learn by the  patient s choice to attend group.     Review of long term goals: See treatment plan developed by outpatient counselor.    Are there any new goals: There were no new goals brought to the undersigned s attention.      The patient completed today s group session for the processing of Mental Health Symptoms and its relationship to the patient s Chemical Dependency. Group content and interventions performed in this session: Check-in, the connection between thoughts, feelings, and behaviors. Group members talked about spending his time working and then isolating when not working.     Patient education on the above topics was provided during this session. The patient was given an opportunity to ask questions and participate in the group discussion. The patient exhibited individual understanding by asking relevant questions and making appropriate comments to other group members.    Patient indicated upon onset of group that his anxiety was a 2 out of 10. Patient reported he felt okay.     Assessment:  1. Schizoaffective disorder, bipolar type, chronic, in remission      No indication of SI, plan and/or means.     Providers Impression of Current Status:   Patient participated actively in today's group therapy session and appears to have learned the impact of one s thinking on feelings and behaviors. Patient will continue to benefit from ongoing psychotherapy that uses CBT and MI.     Psychotherapeutic Techniques: A cognitive behavioral modality was used.     PLAN:    Follow-up: Continue to attend groups to address mental health symptom(s)     Discharge Criteria Planning: Alleviation of mental health symptoms.     Encounter performed and documented by Petty Storm MA, [facility]

## 2021-06-19 NOTE — PROGRESS NOTES
Mental Health Processing Group    Date of Service: 07/18/2018 Start Time: 2:00 Stop Time: 3:00 Total Time: 60 minutes  Frequency: Wednesdays    CPT: 63814    Care Provider: Petty Storm MA, [facility]  N=  4    [facility]    Subjective: The patient was seen today for a 60 minute group psychotherapy session held at [facility].     Mental Status Exam:    Patient was pleasant and cooperative. The patient was oriented X4. The patient made good eye contact and was well dressed with good grooming and hygiene. Recent and remote memories were intact. Concentration and focus was within normal range.  Speech (tone, volume, and rate) were soft and concise. Mood and affect were congruently happy and appropriate for the content of the session. Fund of knowledge was normal. Thought process was mostly logical and linear. Insight and judgment were intact. Patient denied SI, plan and/or means. Pt continues to be motivated for treatment.     Objective:    Patient was able to participate and benefit from treatment as evidenced by the patient s verbal expression and understanding of idea discussed.     New Symptoms or Complaints: The patient did not disclose any new symptoms to the undersigned.     Reason Patient is participating in the group: The group session was necessary for the care of the patient to address how symptoms impact the patient s mental health, relationships and overall well-being.      Patient's response to current intervention: Patient was receptive of feedback given, supportive of other group members and appears to have benefited from the group process. No barriers to learning evidenced.      Progress toward short-term goals: The patient completed the assigned homework which was to assess maladaptive thinking and its connections to the patient s pervious and/or current behaviors.     Patient's Impressions: The patient indicated readiness to learn by the  patient s choice to attend group.     Review of long term goals: See treatment plan developed by outpatient counselor.    Are there any new goals: There were no new goals brought to the undersigned s attention.      The patient completed today s group session for the processing of Mental Health Symptoms and its relationship to the patient s Chemical Dependency. Group content and interventions performed in this session: Check-in, the connection between thoughts, feelings, and behaviors. Group members talked about starting to attend school to learn English. Patient indicated he is hoping this will help him to find a women.     Patient education on the above topics was provided during this session. The patient was given an opportunity to ask questions and participate in the group discussion. The patient exhibited individual understanding by asking relevant questions and making appropriate comments to other group members.    Patient indicated upon onset of group that his anxiety was a 2 out of 10. Patient reported he felt happy.     Assessment:  1. Schizoaffective disorder, bipolar type, chronic, in remission      No indication of SI, plan and/or means.     Providers Impression of Current Status:   Patient participated actively in today's group therapy session and appears to have learned the impact of one s thinking on feelings and behaviors. Patient will continue to benefit from ongoing psychotherapy that uses CBT and MI.     Psychotherapeutic Techniques: A cognitive behavioral modality was used.     PLAN:    Follow-up: Continue to attend groups to address mental health symptom(s)     Discharge Criteria Planning: Alleviation of mental health symptoms.     Encounter performed and documented by Petty Storm MA, [facility]

## 2021-06-20 NOTE — PROGRESS NOTES
Pt is here for culturally sensitive psychiatric med management follow up. Pt is accompanied by his mother. Client stopped outpatient groups, got  in August, has no complains today    Correct pharmacy verified with patient and confirmed in snapshot? [x] yes []no    Charge captured ? [x] yes  [] no    Medications Phoned  to Pharmacy [] yes [x]no  Name of Pharmacist:  List Medications, including dose, quantity and instructions      Medication Prescriptions given to patient   [] yes  [x] no   List the name of the drug the prescription was written for.       Medications ordered this visit were e-scribed.  Verified by order class [x] yes  [] no  Prolixin 5 mg  Medication changes or discontinuations were communicated to patient's pharmacy: [] yes  [x] no    UA collected [] yes  [x] no    [place] Prescription Monitoring Program Reviewed? [] yes  [x] no    Referrals were made to:  PT/OT  Future appointment was made: [x] yes  [] no  1/8/19  Dictation completed at time of chart check: [x] yes  [] no    I have checked the documentation for today s encounters and the above information has been reviewed and completed.

## 2021-06-20 NOTE — PROGRESS NOTES
OUTPATIENT PROGRESS NOTE      Date of visit September 18, 2018    Prydeinig name  Waylon Murdock  Mother's name Ilene Nichole    Reasons For Visit Are Multiple Today:   1.  Culturally sensitive follow-up for mental health issues  2.  Involuntary rocking  movements of the torso dyskinesia, resolved  3.  Follow-up for schizoaffective disorder, no symptoms of psychosis at this time  4.  Follow-up for david, no symptoms of david at this time  5.  High risk medication use: The patient is on Prolixin  6.   Major life event: Recent wedding  7.  Prydein[place] cultural issues: The interview is conducted in Prydein[place] language, bleeding was discussed, which is very important in [place] culture and for supportive interaction as well, as this event was therapeutic for the patient given his long-term effort to get   8.  Education on current medication regimen  9.  Education on future follow-up appointments  10.  Ego activities: Improved, the patient started working voluntarily   11. High risk medication use: And duration of treatment was discussed, I advised the patient to continue Prolixin without change given his multiple severe exacerbations in the past leading to prolonged hospitalizations and commitments, multiple unsuccessful trials of medications in the past  12.  Family conference: The patient is accompanied by his mother who was present during interview per patient's request  13.  Collateral information: Obtained from the patient's mother  14.   Safety issues: The patient requests reevaluation of safety of driving, as his psychiatric symptoms are stable at this time, he wants to be allowed to drive the highways  15.  Request to decrease the dose of Prolixin.  16.  Education on risk of recurrence of  symptoms with decreased dose of antipsychotic        History of present illness/Subjective:  The patient is Prydeinig speaking. The interview is conducted in P[place]dein[place] language, translated personally by me into English records  which adds additional element of complexity to this visit and my assessment. multiple questions and issues were addressed as reflected above.  Treatment plan was discussed in details.  patient is calm, cooperative. No behavioral or stated evidence of   excessive activation.  No behavioral or stated evidence of symptoms of david or psychosis or other mood symptoms /behaviors.  Denying SI/HI/voices/visions/telepathic communication/imaginary girlfriends/thoughts of cutting his thumb off.  Appears to be at baseline.  Compliant.  Appropriately engaging for his level of function.  Smiling appropriately.         Medications:      Current Outpatient Prescriptions   Medication Sig Dispense Refill     CYANOCOBALAMIN, VITAMIN B-12, (VITAMIN B12 ORAL) Take 500 mg by mouth daily.        fluPHENAZine (PROLIXIN) 5 MG tablet Take 1 tablet (5 mg total) by mouth at bedtime. 90 tablet 1     No current facility-administered medications for this visit.          Family history/Social history   Lives with his parents.  Unemployed on [employer].  Volunteers at [employer]          Procedures:  1. Coordination of care: nursing notes, vital signs, multiple records of communication between the patient and the clinic, communication with the pharmacy, and multiple medication orders were reviewed signed and discussed with the patient. Multiple chart entries were reviewed in preparation of documentation and generation of documentation.  2.  Education: was provided on diagnosis, medication regimen, psychotherapeutic treatment modalities, future follow-up appointments.  3.  Counseling: was provided on coping with mental illness.  4.  Collateral information was obtained from  mother  5.  Coordination of care: I  entered all orders and educated the patient as above  6.  Coordination of care: I personally coordinated all medication orders, follow up orders, pharmacy requests, and provided the patient with detailed instructions  in his native Slovenian language  7.  "Slovak cultural and immigration issues were addressed, the interview was conducted in Slovak language, Slovak medications were discussed as it is common in Slovak speaking community to take Russian produced medications which could be dangerous. The patient takes  none at this time      Review Of Systems:  As above.  The reminder of 10 systems was negative.      Vital Signs:    /69 (Patient Site: Right Arm, Patient Position: Sitting, Cuff Size: Adult Large)  Pulse 89  Temp 97.9  F (36.6  C) (Oral)   Ht 5' 6\" (1.676 m)  Wt (!) 256 lb (116.1 kg)  BMI 41.32 kg/m2    Mental Status Examination:     Appearance  calm    Alert and oriented ×3    Attention and concentration   grossly intact    Speech  fluent    Mood  good    Affect  appropriate    Thought processing  logical    Associations  normal    Thought content  no SI/HI/psychosis    Language  normal in Slovak    Short term memory  normal    Long term memory  normal    Fund of knowledge  normal    Psychomotor activity  normal    Gait and station  normal    Insight and judgment  normal        Limited Physical Examination:  Was performed  due to previous restlessness and akathisia related to Prolixin.  Rocking body movements are resolved.     Overweight.      Laboratory Data:    personally reviewed.   No visits with results within 2 Month(s) from this visit.  Latest known visit with results is:    Admission on 07/16/2016, Discharged on 08/18/2016   Component Date Value     Bilirubin, Total 07/27/2016 0.4      Bilirubin, Direct 07/27/2016 0.1      Protein, Total 07/27/2016 7.6      Albumin 07/27/2016 3.9      Alkaline Phosphatase 07/27/2016 91      AST 07/27/2016 20      ALT 07/27/2016 48*     WBC 07/27/2016 7.8      RBC 07/27/2016 5.11      Hemoglobin 07/27/2016 14.7      Hematocrit 07/27/2016 44.3      MCV 07/27/2016 87      MCH 07/27/2016 28.8      MCHC 07/27/2016 33.2      RDW 07/27/2016 12.5      Platelets 07/27/2016 291      MPV 07/27/2016 10.1      " Neutrophils % 07/27/2016 63      Lymphocytes % 07/27/2016 29      Monocytes % 07/27/2016 6      Eosinophils % 07/27/2016 2      Basophils % 07/27/2016 1      Neutrophils Absolute 07/27/2016 4.9      Lymphocytes Absolute 07/27/2016 2.3      Monocytes Absolute 07/27/2016 0.5      Eosinophils Absolute 07/27/2016 0.1      Basophils Absolute 07/27/2016 0.0          Diagnosis:  Past Medical History:   Diagnosis Date     Bipolar I disorder (H)      Calcified granuloma of lung (H) 2014     Obesity (BMI 35.0-39.9 without comorbidity)      Paranoid schizophrenia (H)      Vitamin B 12 deficiency        Patient Active Problem List   Diagnosis     Mood disorder in conditions classified elsewhere     Left arm pain     Dandruff     Other dysfunctions of sleep stages or arousal from sleep     Weight gain     Back ache     Clinical depression     Chronic paranoid schizophrenia (H)     Neuroleptic consent form discussed and signed: July 17, 2016           Schizoaffective disorder, chronic  Akathisia and movement disorder, resolved  High risk medication use   morbid obesity      Treatment Plan:  1. The patient was provided with education and detailed written and verbal instructions in native language on diagnosis, future follow-up, and treatment plan, same instructions were also provided in English language.   2. Instructed to return to clinic in  3 months or sooner if needed, due to decrease in Prolixin.  3. Nurse only clinic is available in the interim if needed.  4. Crisis plan in place.  5. Referral to a culturally sensitive Russian speaking therapist Dr. Albarran was provided.    6.   Continue Prolixin  5 mg at bedtime    7. Close monitoring for side effects and recurrence of symptoms  8.  Continue medications as prescribed and outpatient support groups as previously ordered for stability and prevention of exacerbations, as they tend to be quite severe for Waylon, leading to commitments and hospitalizations  9. Safety of driving  reevaluation per patient's request    Complex visit, multiple issues were addressed as reflected above, total of 16 issues, total time 40 minutes more than 50% coordination of care, family conference, collateral information, a lot of education today as reflected above, counseling, Mosotho cultural issues, review of ophthalmology clinic records and multiple chart and Epic  entries in preparation and generation of pertinent documentation, generation of multiple Epic entries and visit related orders, other details are fully reflected in the procedures paragraph. Please see associated nursing records for other details.         This note was created by undersigned using a Dragon dictation system. All typing errors or contextual distortion are unintentional and software inherent.     Sue Johnson MD
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
## 2021-06-23 NOTE — TELEPHONE ENCOUNTER
Incoming fax received from [facility] , stickered and placed in providers folder in core for review

## 2021-06-23 NOTE — TELEPHONE ENCOUNTER
Crista ( mother) worries that pt started smoking again after 20 years of quitting. Mother reports no other symptoms, wants this nurse to call and talk to the pt about smoking cessation. 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     Dialed Waylon but no answer, left VM stating that I will try to get a hold of him later again.

## 2021-06-24 NOTE — TELEPHONE ENCOUNTER
RECEIVED MEDICAL RECORD FROM [facility] DATED 2-18-19 FOR  EMERGENCY ROOM VISIT - PUT IN PROVIDER'S CLINIC MAILBOX

## 2021-06-24 NOTE — TELEPHONE ENCOUNTER
Incoming call from provider in [place], Dr Nagi Arshad [phone] requests call back asap and have his nurse interrupt him right away

## 2021-06-24 NOTE — TELEPHONE ENCOUNTER
Talked to Dr. AKHTAR on 3/11/19, case discussed in details. The patient is hospitalized in [place] with orders of commitment and ITP.

## 2021-06-25 NOTE — TELEPHONE ENCOUNTER
Called on March 14, questions pertinent to medication regimen/Prolixin Depo injections were discussed. EG

## 2021-06-25 NOTE — TELEPHONE ENCOUNTER
His mother called a few days ago and asked for a transfer to [facility], advised to discuss this matter with his current inpatient provider. According to mother, he moved back and stayed with her since December 4 th, 2018, pt had been compliant with his med regime.

## 2022-02-17 PROBLEM — Z92.89 H/O CT SCAN OF HEAD: Status: ACTIVE | Noted: 2017-09-05
